# Patient Record
Sex: MALE | Race: WHITE | NOT HISPANIC OR LATINO | Employment: OTHER | URBAN - METROPOLITAN AREA
[De-identification: names, ages, dates, MRNs, and addresses within clinical notes are randomized per-mention and may not be internally consistent; named-entity substitution may affect disease eponyms.]

---

## 2023-02-08 ENCOUNTER — OFFICE VISIT (OUTPATIENT)
Dept: OTOLARYNGOLOGY | Facility: CLINIC | Age: 38
End: 2023-02-08
Payer: MEDICAID

## 2023-02-08 VITALS — WEIGHT: 121.4 LBS | TEMPERATURE: 97.7 F | HEIGHT: 64 IN | BODY MASS INDEX: 20.73 KG/M2

## 2023-02-08 DIAGNOSIS — J39.2 HYPERACTIVE GAG REFLEX: ICD-10-CM

## 2023-02-08 DIAGNOSIS — R13.10 DYSPHAGIA, UNSPECIFIED TYPE: Primary | ICD-10-CM

## 2023-02-08 DIAGNOSIS — R11.10 VOMITING IN ADULT: ICD-10-CM

## 2023-02-08 PROCEDURE — 99204 OFFICE O/P NEW MOD 45 MIN: CPT | Performed by: NURSE PRACTITIONER

## 2023-02-08 NOTE — PROGRESS NOTES
Patient Name: Albert Salguero   Visit Date: 02/08/2023   Patient ID: [id number]  Provider: GENA Ortega    Sex: male  Location: [facility]   YOB: 1985  Location Address: [address] [place] [address], [address] [place] [address],?[place]?[id number]    Primary Care Provider Rojas Choi MD  Location Phone: [phone]    Referring Provider: Rojas Choi MD        Chief Complaint  Difficulty Swallowing    Subjective   Albert Salguero is a 37 y.o. male who presents to [facility] today as a consult from Rojas Choi MD for evaluation of gagging and dysphagia symptoms.  He is accompanied by his cousin who is his care tender.  He has a history of Down syndrome.  She reports that she has been taking care of him for a few months now and over that time has noticed that he frequently gags and gets choked on his food easily.  He will often spit out his food or regurgitate food while eating.  She states a lot of times it is vegetables that this happens with but can be a variety of foods.  He has limited verbal communication skills and has not conveyed to them any signs of pain with eating.  They report that he frequently chews and sucks on his tongue and will often make gagging noises even when he is not eating.  His caregiver reports that he has gained weight since living with him and is eating a variety of foods although he does have certain foods and textures that he does not like and will frequently spit out.  They report that often when he eats he will get hiccups and this will make him vomit up food more.      No current outpatient medications on file prior to visit.     No current facility-administered medications on file prior to visit.        Social History     Tobacco Use   • Smoking status: Never     Passive exposure: Current   • Smokeless tobacco: Never   • Tobacco comments:     Elizabet smoke outside   Vaping Use   • Vaping Use: Never used       Objective  "    Vital Signs:   Temp 97.7 °F (36.5 °C) (Temporal)   Ht 162.6 cm (64\")   Wt 55.1 kg (121 lb 6.4 oz)   BMI 20.84 kg/m²       Physical Exam  Constitutional:       General: He is not in acute distress.     Appearance: Normal appearance. He is not ill-appearing.   HENT:      Head: Normocephalic and atraumatic.      Jaw: There is normal jaw occlusion. No tenderness or pain on movement.      Salivary Glands: Right salivary gland is not diffusely enlarged or tender. Left salivary gland is not diffusely enlarged or tender.      Right Ear: Tympanic membrane and external ear normal.      Left Ear: Tympanic membrane and external ear normal.      Ears:      Comments: Narrow ear canals bilaterally     Nose: Nose normal. No septal deviation.      Right Sinus: No maxillary sinus tenderness or frontal sinus tenderness.      Left Sinus: No maxillary sinus tenderness or frontal sinus tenderness.      Mouth/Throat:      Lips: Pink. No lesions.      Mouth: Mucous membranes are moist. No oral lesions.      Dentition: Normal dentition.      Tongue: No lesions.      Palate: No mass and lesions.      Pharynx: Oropharynx is clear. Uvula midline.      Tonsils: No tonsillar exudate.      Comments: Tongue is thickened  Eyes:      Extraocular Movements: Extraocular movements intact.      Conjunctiva/sclera: Conjunctivae normal.      Pupils: Pupils are equal, round, and reactive to light.   Neck:      Thyroid: No thyroid mass, thyromegaly or thyroid tenderness.      Trachea: Trachea normal.   Pulmonary:      Effort: Pulmonary effort is normal. No respiratory distress.   Musculoskeletal:         General: Normal range of motion.      Cervical back: Normal range of motion and neck supple. No tenderness.   Lymphadenopathy:      Cervical: No cervical adenopathy.   Skin:     General: Skin is warm and dry.   Neurological:      General: No focal deficit present.      Mental Status: He is alert and oriented to person, place, and time.      Cranial " Nerves: No cranial nerve deficit.      Motor: No weakness.      Gait: Gait normal.   Psychiatric:         Mood and Affect: Mood normal.         Behavior: Behavior normal.         Thought Content: Thought content normal.         Judgment: Judgment normal.               Result Review :              Assessment and Plan    Diagnoses and all orders for this visit:    1. Dysphagia, unspecified type (Primary)  -     Ambulatory Referral to Gastroenterology    2. Hyperactive gag reflex  -     Ambulatory Referral to Gastroenterology    3. Vomiting in adult  -     Ambulatory Referral to Gastroenterology    I was able to do a physical exam today on the patient.  He does have very narrow ear canals and a thickened tongue.  We have discussed his symptoms and I do feel like he would be better evaluated by gastroenterology due to his frequent vomiting episodes and difficulties with certain foods and inability to perform laryngoscopy on him in clinic today.  We have discussed the possibility that he is having reflux or possibly behavioral food intolerances.  He is not having any alarm symptoms and seems to be gaining weight well and eating a variety of foods with no known episodes of aspiration.  I will place referral to gastroenterology for possible EGD.       Follow Up   No follow-ups on file.  Patient was given instructions and counseling regarding his condition or for health maintenance advice. Please see specific information pulled into the AVS if appropriate.      GENA Ortega

## 2023-02-22 ENCOUNTER — OFFICE VISIT (OUTPATIENT)
Dept: CARDIOLOGY | Facility: CLINIC | Age: 38
End: 2023-02-22
Payer: MEDICAID

## 2023-02-22 VITALS
HEIGHT: 64 IN | SYSTOLIC BLOOD PRESSURE: 106 MMHG | DIASTOLIC BLOOD PRESSURE: 68 MMHG | BODY MASS INDEX: 19.81 KG/M2 | HEART RATE: 56 BPM | WEIGHT: 116 LBS

## 2023-02-22 DIAGNOSIS — Z87.74 HISTORY OF REPAIR OF CONGENITAL ANOMALY OF HEART: Primary | ICD-10-CM

## 2023-02-22 DIAGNOSIS — Q90.9 DOWN'S SYNDROME: ICD-10-CM

## 2023-03-15 NOTE — PROGRESS NOTES
"Chief Complaint   Dysphagia/ vomiting     History of Present Illness       Albert Salguero is a 37 y.o. male who [facility] with a history of Down syndrome, dysphagia, nausea, vomiting and gagging.  Patient presents to the office with his cousins who are his care tenders.  Patient was previously evaluated by ENT and referred to gastroenterology due to gagging and vomiting due to possible reflux.  Patient's cousin reports gagging, clicking noise, enlarged tongue, trouble swallowing, vomiting and frequent hiccups.  Patient has never had previous EGD.  Patient evaluated by cardiologist Dr. Coleman due to congenital heart defect.  Patient family denies fever,  weight loss, night sweats, melena, hematochezia, hematemesis.  No EGD or colon on file for this patient     Most recent labs on 01.13.2023 see below  Results       Result Review :                             Past Medical History       Past Medical History:   Diagnosis Date   • CHD (congenital heart disease)    • Eczema    • Hx of heart surgery        Past Surgical History:   Procedure Laterality Date   • CARDIAC SURGERY     • DENTAL PROCEDURE           Current Outpatient Medications:   •  famotidine (PEPCID) 40 mg/5 mL suspension, Take 2.5 mL by mouth 2 (Two) Times a Day for 50 days., Disp: 150 mL, Rfl: 3     No Known Allergies    Family History   Problem Relation Age of Onset   • No Known Problems Mother    • No Known Problems Father         Social History     Social History Narrative   • Not on file       Objective   Vital Signs:   /72   Pulse 69   Ht 162.6 cm (64\")   Wt 54 kg (119 lb)   SpO2 99%   BMI 20.43 kg/m²       Physical Exam  Constitutional:       General: He is not in acute distress.     Appearance: Normal appearance. He is well-developed and normal weight.   HENT:      Head:      Comments: Enlarged tongue  Eyes:      Conjunctiva/sclera: Conjunctivae normal.      Pupils: Pupils are " equal, round, and reactive to light.      Visual Fields: Right eye visual fields normal and left eye visual fields normal.   Cardiovascular:      Rate and Rhythm: Normal rate and regular rhythm.      Heart sounds: Normal heart sounds.   Pulmonary:      Effort: Pulmonary effort is normal. No retractions.      Breath sounds: Normal breath sounds and air entry.      Comments: Inspection of chest: normal appearance  Abdominal:      General: Bowel sounds are normal.      Palpations: Abdomen is soft.      Tenderness: There is no abdominal tenderness.      Comments: No appreciable hepatosplenomegaly   Musculoskeletal:      Cervical back: Neck supple.      Right lower leg: No edema.      Left lower leg: No edema.   Lymphadenopathy:      Cervical: No cervical adenopathy.   Skin:     Findings: No lesion.      Comments: Turgor normal   Neurological:      Mental Status: He is alert. Mental status is at baseline.   Psychiatric:         Mood and Affect: Mood and affect normal.           Assessment & Plan          Assessment and Plan    Diagnoses and all orders for this visit:    1. Oropharyngeal dysphagia (Primary)    2. Nausea    3. Nausea and vomiting, unspecified vomiting type    4. Episode of gagging    Other orders  -     famotidine (PEPCID) 40 mg/5 mL suspension; Take 2.5 mL by mouth 2 (Two) Times a Day for 50 days.  Dispense: 150 mL; Refill: 3      37-year-old male presenting the office today as new patient with a history of Down syndrome, dysphagia, nausea, vomiting and gagging.  I have recommended that the patient undergo further evaluation with an EGD.  I have discussed this procedure in detail with the patient.  I have discussed the risks, benefits and alternatives.  I have discussed the risk of anesthesia, bleeding and perforation.  Patient understands these risks, benefits and alternatives and wishes to proceed.  I will schedule him at his earliest convenience.  We will seek cardiac clearance from Dr. Coleman.  I have  started the patient on Pepcid suspension due to the patient not tolerating pills.  We will follow-up in office after endoscopy.  Patient and family agreeable to this plan will call with any questions or concerns.            Follow Up       Follow Up   Return for Follow up after endoscopy in office.  Patient was given instructions and counseling regarding his condition or for health maintenance advice. Please see specific information pulled into the AVS if appropriate.

## 2023-03-16 ENCOUNTER — PREP FOR SURGERY (OUTPATIENT)
Dept: OTHER | Facility: HOSPITAL | Age: 38
End: 2023-03-16
Payer: MEDICAID

## 2023-03-16 ENCOUNTER — OFFICE VISIT (OUTPATIENT)
Dept: GASTROENTEROLOGY | Facility: CLINIC | Age: 38
End: 2023-03-16
Payer: MEDICAID

## 2023-03-16 VITALS
BODY MASS INDEX: 20.32 KG/M2 | DIASTOLIC BLOOD PRESSURE: 72 MMHG | SYSTOLIC BLOOD PRESSURE: 108 MMHG | WEIGHT: 119 LBS | HEIGHT: 64 IN | OXYGEN SATURATION: 99 % | HEART RATE: 69 BPM

## 2023-03-16 DIAGNOSIS — R19.8 EPISODE OF GAGGING: ICD-10-CM

## 2023-03-16 DIAGNOSIS — R13.12 OROPHARYNGEAL DYSPHAGIA: Primary | ICD-10-CM

## 2023-03-16 DIAGNOSIS — Q90.9 DOWN'S SYNDROME: ICD-10-CM

## 2023-03-16 DIAGNOSIS — R13.10 DYSPHAGIA, UNSPECIFIED TYPE: ICD-10-CM

## 2023-03-16 DIAGNOSIS — R11.0 NAUSEA: ICD-10-CM

## 2023-03-16 DIAGNOSIS — R11.2 NAUSEA AND VOMITING, UNSPECIFIED VOMITING TYPE: ICD-10-CM

## 2023-03-16 PROCEDURE — 1159F MED LIST DOCD IN RCRD: CPT | Performed by: NURSE PRACTITIONER

## 2023-03-16 PROCEDURE — 99214 OFFICE O/P EST MOD 30 MIN: CPT | Performed by: NURSE PRACTITIONER

## 2023-03-16 PROCEDURE — 1160F RVW MEDS BY RX/DR IN RCRD: CPT | Performed by: NURSE PRACTITIONER

## 2023-03-16 RX ORDER — FAMOTIDINE 40 MG/5ML
20 POWDER, FOR SUSPENSION ORAL 2 TIMES DAILY
Qty: 150 ML | Refills: 3 | Status: SHIPPED | OUTPATIENT
Start: 2023-03-16 | End: 2023-05-05

## 2023-03-16 NOTE — PATIENT INSTRUCTIONS
Food Choices for Gastroesophageal Reflux Disease, Adult  When you have gastroesophageal reflux disease (GERD), the foods you eat and your eating habits are very important. Choosing the right foods can help ease your discomfort. Think about working with a food expert (dietitian) to help you make good choices.  What are tips for following this plan?  Reading food labels  Look for foods that are low in saturated fat. Foods that may help with your symptoms include:  Foods that have less than 5% of daily value (DV) of fat.  Foods that have 0 grams of trans fat.  Cooking  Do not angeles your food.  Cook your food by baking, steaming, grilling, or broiling. These are all methods that do not need a lot of fat for cooking.  To add flavor, try to use herbs that are low in spice and acidity.  Meal planning    Choose healthy foods that are low in fat, such as:  Fruits and vegetables.  Whole grains.  Low-fat dairy products.  Lean meats, fish, and poultry.  Eat small meals often instead of eating 3 large meals each day. Eat your meals slowly in a place where you are relaxed. Avoid bending over or lying down until 2-3 hours after eating.  Limit high-fat foods such as fatty meats or fried foods.  Limit your intake of fatty foods, such as oils, butter, and shortening.  Avoid the following as told by your doctor:  Foods that cause symptoms. These may be different for different people. Keep a food diary to keep track of foods that cause symptoms.  Alcohol.  Drinking a lot of liquid with meals.  Eating meals during the 2-3 hours before bed.  Lifestyle  Stay at a healthy weight. Ask your doctor what weight is healthy for you. If you need to lose weight, work with your doctor to do so safely.  Exercise for at least 30 minutes on 5 or more days each week, or as told by your doctor.  Wear loose-fitting clothes.  Do not smoke or use any products that contain nicotine or tobacco. If you need help quitting, ask your doctor.  Sleep with the head  of your bed higher than your feet. Use a wedge under the mattress or blocks under the bed frame to raise the head of the bed.  Chew sugar-free gum after meals.  What foods should eat?  Eat a healthy, well-balanced diet of fruits, vegetables, whole grains, low-fat dairy products, lean meats, fish, and poultry. Each person is different.  Foods that may cause symptoms in one person may not cause any symptoms in another person. Work with your doctor to find foods that are safe for you.  The items listed above may not be a complete list of what you can eat and drink. Contact a food expert for more options.  What foods should I avoid?  Limiting some of these foods may help in managing the symptoms of GERD. Everyone is different. Talk with a food expert or your doctor to help you find the exact foods to avoid, if any.  Fruits  Any fruits prepared with added fat. Any fruits that cause symptoms. For some people, this may include citrus fruits, such as oranges, grapefruit, pineapple, and zaida.  Vegetables  Deep-fried vegetables. French fries. Any vegetables prepared with added fat. Any vegetables that cause symptoms. For some people, this may include tomatoes and tomato products, chili peppers, onions and garlic, and horseradish.  Grains  Pastries or quick breads with added fat.  Meats and other proteins  High-fat meats, such as fatty beef or pork, hot dogs, ribs, ham, sausage, salami, and farrar. Fried meat or protein, including fried fish and fried chicken. Nuts and nut butters, in large amounts.  Dairy  Whole milk and chocolate milk. Sour cream. Cream. Ice cream. Cream cheese. Milkshakes.  Fats and oils  Butter. Margarine. Shortening. Ghee.  Beverages  Coffee and tea, with or without caffeine. Carbonated beverages. Sodas. Energy drinks. Fruit juice made with acidic fruits, such as orange or grapefruit. Tomato juice. Alcoholic drinks.  Sweets and desserts  Chocolate and cocoa. Donuts.  Seasonings and condiments  Pepper.  Peppermint and spearmint. Added salt. Any condiments, herbs, or seasonings that cause symptoms. For some people, this may include girard, hot sauce, or vinegar-based salad dressings.  The items listed above may not be a complete list of what you should not eat and drink. Contact a food expert for more options.  Questions to ask your doctor  Diet and lifestyle changes are often the first steps that are taken to manage symptoms of GERD. If diet and lifestyle changes do not help, talk with your doctor about taking medicines.  Where to find more information  International Foundation for Gastrointestinal Disorders: aboutgerd.org  Summary  When you have GERD, food and lifestyle choices are very important in easing your symptoms.  Eat small meals often instead of 3 large meals a day. Eat your meals slowly and in a place where you are relaxed.  Avoid bending over or lying down until 2-3 hours after eating.  Limit high-fat foods such as fatty meats or fried foods.  This information is not intended to replace advice given to you by your health care provider. Make sure you discuss any questions you have with your health care provider.  Document Revised: 06/28/2021 Document Reviewed: 06/28/2021  Elsevier Patient Education © 2022 Else[employer] Inc.

## 2023-04-17 ENCOUNTER — TELEPHONE (OUTPATIENT)
Dept: GASTROENTEROLOGY | Facility: CLINIC | Age: 38
End: 2023-04-17
Payer: MEDICAID

## 2023-04-17 NOTE — TELEPHONE ENCOUNTER
I spoke with Fozia Maloney, Per patient GALDINO. Advised her that we will need to reschedule patients EGD for after his cardio ECHO. Patient voiced understanding but did not have her scheduling book at the time. Mrs. Llamas stated she would call me first thing in the morning to get pts EGD rescheduled.

## 2023-04-18 NOTE — TELEPHONE ENCOUNTER
Patients EGD has been rescheduled for 6/14/23 with an estimated arrival time of 2pm. Patients guardian voiced understanding.

## 2023-05-11 ENCOUNTER — HOSPITAL ENCOUNTER (OUTPATIENT)
Dept: CARDIOLOGY | Facility: HOSPITAL | Age: 38
Discharge: HOME | End: 2023-05-11
Payer: MEDICAID

## 2023-05-11 DIAGNOSIS — Z87.74 HISTORY OF REPAIR OF CONGENITAL ANOMALY OF HEART: ICD-10-CM

## 2023-05-11 DIAGNOSIS — Q90.9 DOWN'S SYNDROME: ICD-10-CM

## 2023-05-11 PROCEDURE — 93306 TTE W/DOPPLER COMPLETE: CPT | Performed by: INTERNAL MEDICINE

## 2023-05-11 PROCEDURE — 93306 TTE W/DOPPLER COMPLETE: CPT

## 2023-05-12 ENCOUNTER — TELEPHONE (OUTPATIENT)
Dept: CARDIOLOGY | Facility: CLINIC | Age: 38
End: 2023-05-12
Payer: MEDICAID

## 2023-05-12 LAB
BH CV ECHO MEAS - AO MAX PG: 7 MMHG
BH CV ECHO MEAS - AO MEAN PG: 3.8 MMHG
BH CV ECHO MEAS - AO ROOT DIAM: 2.43 CM
BH CV ECHO MEAS - AO V2 MAX: 130 CM/SEC
BH CV ECHO MEAS - AO V2 VTI: 26.4 CM
BH CV ECHO MEAS - AVA(I,D): 1.97 CM2
BH CV ECHO MEAS - EDV(CUBED): 76.9 ML
BH CV ECHO MEAS - EDV(MOD-SP2): 61.9 ML
BH CV ECHO MEAS - EDV(MOD-SP4): 68.2 ML
BH CV ECHO MEAS - EF(MOD-BP): 63.8 %
BH CV ECHO MEAS - EF(MOD-SP2): 66.9 %
BH CV ECHO MEAS - EF(MOD-SP4): 59.1 %
BH CV ECHO MEAS - ESV(CUBED): 20.7 ML
BH CV ECHO MEAS - ESV(MOD-SP2): 20.5 ML
BH CV ECHO MEAS - ESV(MOD-SP4): 27.9 ML
BH CV ECHO MEAS - FS: 35.4 %
BH CV ECHO MEAS - IVS/LVPW: 1.09 CM
BH CV ECHO MEAS - IVSD: 0.89 CM
BH CV ECHO MEAS - LA DIMENSION: 3.1 CM
BH CV ECHO MEAS - LAT PEAK E' VEL: 19.3 CM/SEC
BH CV ECHO MEAS - LV DIASTOLIC VOL/BSA (35-75): 43.5 CM2
BH CV ECHO MEAS - LV MASS(C)D: 112 GRAMS
BH CV ECHO MEAS - LV MAX PG: 3.2 MMHG
BH CV ECHO MEAS - LV MEAN PG: 1.95 MMHG
BH CV ECHO MEAS - LV SYSTOLIC VOL/BSA (12-30): 17.8 CM2
BH CV ECHO MEAS - LV V1 MAX: 90 CM/SEC
BH CV ECHO MEAS - LV V1 VTI: 16.4 CM
BH CV ECHO MEAS - LVIDD: 4.3 CM
BH CV ECHO MEAS - LVIDS: 2.7 CM
BH CV ECHO MEAS - LVOT AREA: 3.2 CM2
BH CV ECHO MEAS - LVOT DIAM: 2.01 CM
BH CV ECHO MEAS - LVPWD: 0.81 CM
BH CV ECHO MEAS - MED PEAK E' VEL: 9.6 CM/SEC
BH CV ECHO MEAS - MV A MAX VEL: 50.1 CM/SEC
BH CV ECHO MEAS - MV DEC SLOPE: 431.9 CM/SEC2
BH CV ECHO MEAS - MV DEC TIME: 0.24 MSEC
BH CV ECHO MEAS - MV E MAX VEL: 75.8 CM/SEC
BH CV ECHO MEAS - MV E/A: 1.51
BH CV ECHO MEAS - MV MEAN PG: 1.44 MMHG
BH CV ECHO MEAS - MV P1/2T: 73 MSEC
BH CV ECHO MEAS - MV V2 VTI: 26 CM
BH CV ECHO MEAS - MVA(P1/2T): 3 CM2
BH CV ECHO MEAS - MVA(VTI): 2 CM2
BH CV ECHO MEAS - RAP SYSTOLE: 3 MMHG
BH CV ECHO MEAS - RVDD: 2.04 CM
BH CV ECHO MEAS - RVSP: 22.8 MMHG
BH CV ECHO MEAS - SI(MOD-SP2): 26.4 ML/M2
BH CV ECHO MEAS - SI(MOD-SP4): 25.7 ML/M2
BH CV ECHO MEAS - SV(LVOT): 52.1 ML
BH CV ECHO MEAS - SV(MOD-SP2): 41.4 ML
BH CV ECHO MEAS - SV(MOD-SP4): 40.3 ML
BH CV ECHO MEAS - TR MAX PG: 19.8 MMHG
BH CV ECHO MEAS - TR MAX VEL: 222.6 CM/SEC
BH CV ECHO MEASUREMENTS AVERAGE E/E' RATIO: 5.25
BH CV ECHO SHUNT ASSESSMENT PERFORMED (HIDDEN SCRIPTING): 1
IVRT: 95 MSEC
LEFT ATRIUM VOLUME INDEX: 24.7 ML/M2
MAXIMAL PREDICTED HEART RATE: 184 BPM
STRESS TARGET HR: 156 BPM

## 2023-05-12 NOTE — TELEPHONE ENCOUNTER
Procedure: Colonoscopy and/or EGD    Medication Directive: NA    PMH: Congenital anomaly of heart repair, Down Syndrome    Last Seen: 02/02/23    ECHO: 5/11/23  Left ventricular ejection fraction appears to be 61 - 65%.  •  Left ventricular diastolic function was normal.  •  Saline test results are negative.  •  Estimated right ventricular systolic pressure from tricuspid regurgitation is normal (<35 mmHg).  •  Interatrial septum is thickened and bright indicating possible prior repair, no evidence of residual ASD

## 2023-05-12 NOTE — TELEPHONE ENCOUNTER
He may proceed with upcoming colonoscopy and/or EGD at moderate risk for perioperative cardiac events.

## 2023-06-07 ENCOUNTER — TELEPHONE (OUTPATIENT)
Dept: CARDIOLOGY | Facility: CLINIC | Age: 38
End: 2023-06-07
Payer: MEDICAID

## 2023-06-07 NOTE — TELEPHONE ENCOUNTER
----- Message from GENA Andrade sent at 6/4/2023  2:24 PM EDT -----  Echocardiogram shows normal function of the heart muscles with an EF of 61 to 65%.  There is no residual opening from his previously repaired septal defect.  No treatment change recommendations.  Recommend routine follow-up in 6 to 9 months, unless there are new concerns.  Please let patient/family know it will be with another provider in the practice as Dr. Coleman is no longer with us.

## 2023-06-08 NOTE — PRE-PROCEDURE INSTRUCTIONS
Instructed patient on date and arrival time for procedure.  Come to entrance C.  Will need a  over the age of 18 to drive them home.  May have two visitors.  No children under 12.  Over 12 must be accompanied by an adult.  Do not take medications the day of procedure but bring them to the hospital.  Discussed NPO and diet.  Verbalized understanding of instructions given. Gave number to call if questions or concerns.  Spoke with guardian.  Cardiac clearance noted in chart.

## 2023-06-14 ENCOUNTER — HOSPITAL ENCOUNTER (OUTPATIENT)
Facility: HOSPITAL | Age: 38
Setting detail: HOSPITAL OUTPATIENT SURGERY
Discharge: HOME | End: 2023-06-14
Attending: INTERNAL MEDICINE | Admitting: INTERNAL MEDICINE
Payer: MEDICAID

## 2023-06-14 ENCOUNTER — ANESTHESIA EVENT (OUTPATIENT)
Dept: GASTROENTEROLOGY | Facility: HOSPITAL | Age: 38
End: 2023-06-14
Payer: MEDICAID

## 2023-06-14 ENCOUNTER — ANESTHESIA (OUTPATIENT)
Dept: GASTROENTEROLOGY | Facility: HOSPITAL | Age: 38
End: 2023-06-14
Payer: MEDICAID

## 2023-06-14 VITALS
OXYGEN SATURATION: 95 % | RESPIRATION RATE: 14 BRPM | DIASTOLIC BLOOD PRESSURE: 65 MMHG | HEIGHT: 60 IN | BODY MASS INDEX: 22.07 KG/M2 | HEART RATE: 60 BPM | TEMPERATURE: 98.6 F | WEIGHT: 112.43 LBS | SYSTOLIC BLOOD PRESSURE: 96 MMHG

## 2023-06-14 DIAGNOSIS — R11.2 NAUSEA AND VOMITING, UNSPECIFIED VOMITING TYPE: ICD-10-CM

## 2023-06-14 DIAGNOSIS — Q90.9 DOWN'S SYNDROME: ICD-10-CM

## 2023-06-14 DIAGNOSIS — R13.12 OROPHARYNGEAL DYSPHAGIA: ICD-10-CM

## 2023-06-14 DIAGNOSIS — R13.10 DYSPHAGIA, UNSPECIFIED TYPE: ICD-10-CM

## 2023-06-14 DIAGNOSIS — R19.8 EPISODE OF GAGGING: ICD-10-CM

## 2023-06-14 DIAGNOSIS — R11.0 NAUSEA: ICD-10-CM

## 2023-06-14 PROCEDURE — 25010000002 PROPOFOL 10 MG/ML EMULSION: Performed by: NURSE ANESTHETIST, CERTIFIED REGISTERED

## 2023-06-14 PROCEDURE — C1726 CATH, BAL DIL, NON-VASCULAR: HCPCS | Performed by: INTERNAL MEDICINE

## 2023-06-14 PROCEDURE — 43239 EGD BIOPSY SINGLE/MULTIPLE: CPT | Performed by: INTERNAL MEDICINE

## 2023-06-14 PROCEDURE — 43249 ESOPH EGD DILATION <30 MM: CPT | Performed by: INTERNAL MEDICINE

## 2023-06-14 PROCEDURE — 88305 TISSUE EXAM BY PATHOLOGIST: CPT | Performed by: INTERNAL MEDICINE

## 2023-06-14 RX ORDER — SODIUM CHLORIDE, SODIUM LACTATE, POTASSIUM CHLORIDE, CALCIUM CHLORIDE 600; 310; 30; 20 MG/100ML; MG/100ML; MG/100ML; MG/100ML
INJECTION, SOLUTION INTRAVENOUS CONTINUOUS PRN
Status: DISCONTINUED | OUTPATIENT
Start: 2023-06-14 | End: 2023-06-14 | Stop reason: SURG

## 2023-06-14 RX ORDER — PROPOFOL 10 MG/ML
VIAL (ML) INTRAVENOUS AS NEEDED
Status: DISCONTINUED | OUTPATIENT
Start: 2023-06-14 | End: 2023-06-14 | Stop reason: SURG

## 2023-06-14 RX ORDER — GLYCOPYRROLATE 0.2 MG/ML
INJECTION INTRAMUSCULAR; INTRAVENOUS AS NEEDED
Status: DISCONTINUED | OUTPATIENT
Start: 2023-06-14 | End: 2023-06-14 | Stop reason: SURG

## 2023-06-14 RX ORDER — MIDAZOLAM HYDROCHLORIDE 2 MG/ML
10 SYRUP ORAL ONCE
Status: COMPLETED | OUTPATIENT
Start: 2023-06-14 | End: 2023-06-14

## 2023-06-14 RX ORDER — PHENYLEPHRINE HCL IN 0.9% NACL 1 MG/10 ML
SYRINGE (ML) INTRAVENOUS AS NEEDED
Status: DISCONTINUED | OUTPATIENT
Start: 2023-06-14 | End: 2023-06-14 | Stop reason: SURG

## 2023-06-14 RX ORDER — SODIUM CHLORIDE, SODIUM LACTATE, POTASSIUM CHLORIDE, CALCIUM CHLORIDE 600; 310; 30; 20 MG/100ML; MG/100ML; MG/100ML; MG/100ML
30 INJECTION, SOLUTION INTRAVENOUS CONTINUOUS
Status: DISCONTINUED | OUTPATIENT
Start: 2023-06-14 | End: 2023-06-14 | Stop reason: HOSPADM

## 2023-06-14 RX ORDER — LIDOCAINE HYDROCHLORIDE 20 MG/ML
INJECTION, SOLUTION EPIDURAL; INFILTRATION; INTRACAUDAL; PERINEURAL AS NEEDED
Status: DISCONTINUED | OUTPATIENT
Start: 2023-06-14 | End: 2023-06-14 | Stop reason: SURG

## 2023-06-14 RX ORDER — FAMOTIDINE 40 MG/5ML
20 POWDER, FOR SUSPENSION ORAL 2 TIMES DAILY
COMMUNITY

## 2023-06-14 RX ADMIN — GLYCOPYRROLATE 0.2 MG: 0.2 INJECTION INTRAMUSCULAR; INTRAVENOUS at 10:09

## 2023-06-14 RX ADMIN — SODIUM CHLORIDE, POTASSIUM CHLORIDE, SODIUM LACTATE AND CALCIUM CHLORIDE: 600; 310; 30; 20 INJECTION, SOLUTION INTRAVENOUS at 09:27

## 2023-06-14 RX ADMIN — MIDAZOLAM HYDROCHLORIDE 10 MG: 2 SYRUP ORAL at 08:27

## 2023-06-14 RX ADMIN — SODIUM CHLORIDE, POTASSIUM CHLORIDE, SODIUM LACTATE AND CALCIUM CHLORIDE 30 ML/HR: 600; 310; 30; 20 INJECTION, SOLUTION INTRAVENOUS at 08:58

## 2023-06-14 RX ADMIN — LIDOCAINE HYDROCHLORIDE 100 MG: 20 INJECTION, SOLUTION EPIDURAL; INFILTRATION; INTRACAUDAL; PERINEURAL at 09:30

## 2023-06-14 RX ADMIN — Medication 100 MCG: at 09:39

## 2023-06-14 RX ADMIN — PROPOFOL 333 MCG/KG/MIN: 10 INJECTION, EMULSION INTRAVENOUS at 09:30

## 2023-06-14 RX ADMIN — PROPOFOL 50 MG: 10 INJECTION, EMULSION INTRAVENOUS at 09:30

## 2023-06-14 NOTE — ANESTHESIA PREPROCEDURE EVALUATION
Anesthesia Evaluation     Patient summary reviewed and Nursing notes reviewed   no history of anesthetic complications:   NPO Solid Status: > 8 hours  NPO Liquid Status: > 2 hours           Airway   Possible difficult intubation  Dental      Comment: Unable to access, multiple very loose teeth, going to dentist soon    Pulmonary - negative pulmonary ROS and normal exam    breath sounds clear to auscultation  Cardiovascular - negative cardio ROS and normal exam  Exercise tolerance: good (4-7 METS)    Rhythm: regular  Rate: normal        Neuro/Psych- negative ROS    ROS Comment: Down's syndrome  GI/Hepatic/Renal/Endo - negative ROS     Musculoskeletal (-) negative ROS    Abdominal    Substance History - negative use     OB/GYN negative ob/gyn ROS         Other - negative ROS       ROS/Med Hx Other: PAT Nursing Notes unavailable.                 Anesthesia Plan    ASA 3     general       Anesthetic plan, risks, benefits, and alternatives have been provided, discussed and informed consent has been obtained with: other and legal guardian.    CODE STATUS:

## 2023-06-14 NOTE — ANESTHESIA POSTPROCEDURE EVALUATION
Patient: Albert Salguero    Procedure Summary       Date: 06/14/23 Room / Location: [facility] ENDOSCOPY 2 / [facility] ENDOSCOPY    Anesthesia Start: 0927 Anesthesia Stop: 0949    Procedure: ESOPHAGOGASTRODUODENOSCOPY with biopsies, diloation with 15- 18 mm balloon Diagnosis:       Oropharyngeal dysphagia      Nausea      Episode of gagging      Nausea and vomiting, unspecified vomiting type      Down's syndrome      Dysphagia, unspecified type      (Oropharyngeal dysphagia [R13.12])      (Nausea [R11.0])      (Episode of gagging [R19.8])      (Nausea and vomiting, unspecified vomiting type [R11.2])      (Down's syndrome [Q90.9])      (Dysphagia, unspecified type [R13.10])    Surgeons: Garfield Kumar MD Provider: Alek Dykes MD    Anesthesia Type: general ASA Status: 3            Anesthesia Type: general    Vitals  Vitals Value Taken Time   BP 96/65 06/14/23 1034   Temp 37 °C (98.6 °F) 06/14/23 0949   Pulse 60 06/14/23 1034   Resp 14 06/14/23 1034   SpO2 95 % 06/14/23 1034           Post Anesthesia Care and Evaluation    Patient location during evaluation: bedside  Patient participation: complete - patient participated  Level of consciousness: awake  Pain management: adequate    Airway patency: patent  PONV Status: none  Cardiovascular status: acceptable and stable  Respiratory status: acceptable  Hydration status: acceptable    Comments: An Anesthesiologist personally participated in the most demanding procedures (including induction and emergence if applicable) in the anesthesia plan, monitored the course of anesthesia administration at frequent intervals and remained physically present and available for immediate diagnosis and treatment of emergencies.

## 2023-06-14 NOTE — H&P
Pre Procedure History & Physical    Chief Complaint:   Nausea  Vomiting  Gerd  dysphagia    Subjective     HPI:   As above    Past Medical History:   Past Medical History:   Diagnosis Date    CHD (congenital heart disease)     Eczema     Hx of heart surgery        Past Surgical History:  Past Surgical History:   Procedure Laterality Date    CARDIAC SURGERY      DENTAL PROCEDURE         Family History:  Family History   Problem Relation Age of Onset    No Known Problems Mother     No Known Problems Father        Social History:   reports that he has never smoked. He has been exposed to tobacco smoke. He has never used smokeless tobacco. He reports that he does not drink alcohol and does not use drugs.    Medications:   No medications prior to admission.       Allergies:  Patient has no known allergies.        Objective     Weight 51 kg (112 lb 7 oz).    Physical Exam   Constitutional: Pt is oriented to person, place, and time and well-developed, well-nourished, and in no distress.   Mouth/Throat: Oropharynx is clear and moist.   Neck: Normal range of motion.   Cardiovascular: Normal rate, regular rhythm and normal heart sounds.    Pulmonary/Chest: Effort normal and breath sounds normal.   Abdominal: Soft. Nontender  Skin: Skin is warm and dry.   Psychiatric: Mood, memory, affect and judgment normal.     Assessment & Plan     Diagnosis:  Nausea  Vomiting  Gerd  dysphagia    Anticipated Surgical Procedure:  egd    The risks, benefits, and alternatives of this procedure have been discussed with the patient or the responsible party- the patient understands and agrees to proceed.

## 2023-06-15 LAB
CYTO UR: NORMAL
LAB AP CASE REPORT: NORMAL
LAB AP CLINICAL INFORMATION: NORMAL
PATH REPORT.FINAL DX SPEC: NORMAL
PATH REPORT.GROSS SPEC: NORMAL

## 2023-06-16 ENCOUNTER — TELEPHONE (OUTPATIENT)
Dept: GASTROENTEROLOGY | Facility: CLINIC | Age: 38
End: 2023-06-16
Payer: MEDICAID

## 2023-06-16 NOTE — TELEPHONE ENCOUNTER
----- Message from GENA Garcia sent at 6/15/2023  2:11 PM EDT -----  Reflux esophagitis. I have reviewed upper endoscopy. Negative results for H. Pylori, metaplasia, dysplasia and malignancy.

## 2023-09-22 ENCOUNTER — HOSPITAL ENCOUNTER (EMERGENCY)
Facility: HOSPITAL | Age: 38
Discharge: HOME | End: 2023-09-23
Attending: EMERGENCY MEDICINE
Payer: MEDICAID

## 2023-09-22 DIAGNOSIS — Q90.9 DOWN SYNDROME: ICD-10-CM

## 2023-09-22 DIAGNOSIS — Z71.1 WORRIED WELL: Primary | ICD-10-CM

## 2023-09-22 PROCEDURE — 99282 EMERGENCY DEPT VISIT SF MDM: CPT

## 2023-09-23 VITALS
OXYGEN SATURATION: 100 % | RESPIRATION RATE: 18 BRPM | HEART RATE: 76 BPM | BODY MASS INDEX: 19.22 KG/M2 | DIASTOLIC BLOOD PRESSURE: 57 MMHG | HEIGHT: 63 IN | TEMPERATURE: 99.1 F | WEIGHT: 108.47 LBS | SYSTOLIC BLOOD PRESSURE: 122 MMHG

## 2023-09-23 NOTE — ED PROVIDER NOTES
"Time: 8:50 PM EDT  Date of encounter:  9/22/2023  Independent Historian/Clinical History and Information was obtained by:   Family    History is limited by: N/A    Chief Complaint   Patient presents with    Rash     Guardian states, \"Patient has worms and larva coming out of his ears, and on his scrotum.\"         History of Present Illness:  Patient is a 38 y.o. year old male who presents to the emergency department for evaluation of parasites coming out of his ears and worms from his scrotum.  Guardian also states that he is having LarvE tenting of his nose.  Guardian states that she gave a stool sample to his PCP to check for worms.    HPI    Patient Care Team  Primary Care Provider: Rojas Choi MD    Past Medical History:     No Known Allergies  Past Medical History:   Diagnosis Date    CHD (congenital heart disease)     Down syndrome     Eczema     Hx of heart surgery      Past Surgical History:   Procedure Laterality Date    CARDIAC SURGERY      DENTAL PROCEDURE      ENDOSCOPY N/A 6/14/2023    Procedure: ESOPHAGOGASTRODUODENOSCOPY with biopsies, diloation with 15- 18 mm balloon;  Surgeon: Garfield Kumar MD;  Location: [facility] ENDOSCOPY;  Service: Gastroenterology;  Laterality: N/A;  hiatal hernia, stricture     Family History   Problem Relation Age of Onset    No Known Problems Mother     No Known Problems Father        Home Medications:  Prior to Admission medications    Medication Sig Start Date End Date Taking? Authorizing Provider   clotrimazole (LOTRIMIN) 1 % cream Use for no longer than 7 days.  Mix in equal portion with triamcinolone cream and apply to jock itch twice a day. 7/26/23   Avi Elmore MD   hydrocortisone 1 % cream Apply 1 application  topically to the appropriate area as directed 2 (Two) Times a Day. May also apply to irritated areas of face twice daily for up to 1 week. 8/1/23   Avi Elmore MD   triamcinolone (KENALOG) 0.1 % cream Use for no longer than 1 week.  Mix in " "equal portion with clotrimazole cream and apply to jock itch twice daily. 7/26/23   Avi Elmore MD        Social History:   Social History     Tobacco Use    Smoking status: Never     Passive exposure: Current    Smokeless tobacco: Never    Tobacco comments:     Elizabet smoke outside   Vaping Use    Vaping Use: Never used   Substance Use Topics    Alcohol use: Never    Drug use: Never         Review of Systems:  Review of Systems   HENT:  Positive for ear discharge (Per guardian patient had larva coming out of his ears and nose).    Skin:  Positive for rash (Per guardian on scrotum).      Physical Exam:  /57   Pulse 76   Temp 99.1 °F (37.3 °C) (Oral)   Resp 18   Ht 160 cm (63\")   Wt 49.2 kg (108 lb 7.5 oz)   SpO2 100%   BMI 19.21 kg/m²         Physical Exam  Vitals and nursing note reviewed.   Constitutional:       General: He is not in acute distress.     Appearance: Normal appearance. He is not ill-appearing or toxic-appearing.   HENT:      Head: Normocephalic and atraumatic.      Right Ear: Tympanic membrane, ear canal and external ear normal.      Left Ear: Tympanic membrane, ear canal and external ear normal.      Nose: Nose normal.      Mouth/Throat:      Mouth: Mucous membranes are moist.   Eyes:      General: No scleral icterus.     Conjunctiva/sclera: Conjunctivae normal.   Cardiovascular:      Rate and Rhythm: Normal rate and regular rhythm.      Pulses: Normal pulses.      Heart sounds: Normal heart sounds.   Pulmonary:      Effort: Pulmonary effort is normal. No respiratory distress.      Breath sounds: Normal breath sounds.   Abdominal:      General: Abdomen is flat. Bowel sounds are normal. There is no distension.      Palpations: Abdomen is soft.      Tenderness: There is no abdominal tenderness. There is no right CVA tenderness or left CVA tenderness.   Genitourinary:     Penis: Normal.       Testes: Normal.      Comments: Scrotum without abnormalities or lesions  Musculoskeletal: "         General: Normal range of motion.      Cervical back: Normal range of motion and neck supple.   Skin:     General: Skin is warm and dry.      Coloration: Skin is not cyanotic.   Neurological:      Mental Status: He is alert. Mental status is at baseline.   Psychiatric:         Attention and Perception: Attention and perception normal.         Mood and Affect: Mood normal.         Behavior: Behavior normal.                Medical Decision Making:      Comorbidities that affect care:    Down syndrome, nonverbal    External Notes reviewed:    Previous Clinic Note: Patient seen in clinic August 1 urgent care for dermatitis      The following orders were placed and all results were independently analyzed by me:  No orders of the defined types were placed in this encounter.      Medications Given in the Emergency Department:  Medications - No data to display     ED Course:    The patient was initially evaluated in the triage area where orders were placed. The patient was later dispositioned by GENA Pearl.      The patient was advised to stay for completion of workup which includes but is not limited to communication of labs and radiological results, reassessment and plan. The patient was advised that leaving prior to disposition by a provider could result in critical findings that are not communicated to the patient.     ED Course as of 09/23/23 0700   Fri Sep 22, 2023   2051 --- PROVIDER IN TRIAGE NOTE ---    The patient was evaluated by Sandy olmedo in triage. Orders were placed and the patient is currently awaiting disposition.    [AJ]      ED Course User Index  [AJ] Sandy Avery PA-C       Labs:    Lab Results (last 24 hours)       ** No results found for the last 24 hours. **             Imaging:    No Radiology Exams Resulted Within Past 24 Hours      Differential Diagnosis and Discussion:      Rash: Differential diagnosis includes but is not limited to sepsis, cellulitis, Paul  Mountain Spotted Fever, meningitis, meningococcemia, Varicella, Strep infection, dermatitis, allergic reaction, Lyme disease, and toxic shock syndrome.        MDM  Number of Diagnoses or Management Options  Down syndrome  Worried well  Diagnosis management comments: I have explained the patient´s condition, diagnoses and treatment plan based on the information available to me at this time. I have answered questions and addressed any concerns. The patient has a good  understanding of the patient´s diagnosis, condition, and treatment plan as can be expected at this point. The vital signs have been stable. The patient´s condition is stable and appropriate for discharge from the emergency department.      The patient will pursue further outpatient evaluation with the primary care physician or other designated or consulting physician as outlined in the discharge instructions. They are agreeable to this plan of care and follow-up instructions have been explained in detail. The patient has received these instructions in written format and have expressed an understanding of the discharge instructions. The patient is aware that any significant change in condition or worsening of symptoms should prompt an immediate return to this or the closest emergency department or call to 911.       Amount and/or Complexity of Data Reviewed  Obtain history from someone other than the patient: yes (Caregiver)    Risk of Complications, Morbidity, and/or Mortality  Presenting problems: low  Management options: low    Patient Progress  Patient progress: stable         Patient Care Considerations:    LABS: I considered ordering labs, however no systemic signs of illness or injury are noted      Consultants/Shared Management Plan:    None    Social Determinants of Health:    Patient has presented with family members who are responsible, reliable and will ensure follow up care.      Disposition and Care Coordination:    Discharged: The patient  is suitable and stable for discharge with no need for consideration of observation or admission.    I have explained the patient´s condition, diagnoses and treatment plan based on the information available to me at this time. I have answered questions and addressed any concerns. The patient has a good  understanding of the patient´s diagnosis, condition, and treatment plan as can be expected at this point. The vital signs have been stable. The patient´s condition is stable and appropriate for discharge from the emergency department.      The patient will pursue further outpatient evaluation with the primary care physician or other designated or consulting physician as outlined in the discharge instructions. They are agreeable to this plan of care and follow-up instructions have been explained in detail. The patient has received these instructions in written format and have expressed an understanding of the discharge instructions. The patient is aware that any significant change in condition or worsening of symptoms should prompt an immediate return to this or the closest emergency department or call to 911.  I have explained discharge medications and the need for follow up with the patient/caretakers. This was also printed in the discharge instructions. Patient was discharged with the following medications and follow up:      Medication List      No changes were made to your prescriptions during this visit.      Rojas Choi MD  [address] [facility] [place] [address]  [phone]    On 9/25/2023         Final diagnoses:   Worried well   Down syndrome        ED Disposition       ED Disposition   Discharge    Condition   Stable    Comment   --               This medical record created using voice recognition software.             Maria Antonia Kasper, APRTRISTIN  09/23/23 0700

## 2023-09-23 NOTE — DISCHARGE INSTRUCTIONS
Physical exam and evaluation was normal here today with no acute findings or signs of infestation.    Follow-up with PCP Monday

## 2023-12-01 RX ORDER — FAMOTIDINE 40 MG/5ML
20 POWDER, FOR SUSPENSION ORAL 2 TIMES DAILY
Qty: 50 ML | Refills: 3 | Status: SHIPPED | OUTPATIENT
Start: 2023-12-01

## 2023-12-01 NOTE — TELEPHONE ENCOUNTER
Pt called in wanting a refill on famotidine. Will send in a refill at this time. Pt will need appointment for any refills after

## 2024-01-15 RX ORDER — FAMOTIDINE 40 MG/5ML
POWDER, FOR SUSPENSION ORAL
Qty: 50 ML | Refills: 3 | Status: SHIPPED | OUTPATIENT
Start: 2024-01-15

## 2024-06-20 ENCOUNTER — OFFICE VISIT (OUTPATIENT)
Dept: OTOLARYNGOLOGY | Facility: CLINIC | Age: 39
End: 2024-06-20
Payer: MEDICAID

## 2024-06-20 ENCOUNTER — LAB (OUTPATIENT)
Dept: LAB | Facility: HOSPITAL | Age: 39
End: 2024-06-20
Payer: MEDICAID

## 2024-06-20 ENCOUNTER — PROCEDURE VISIT (OUTPATIENT)
Dept: OTOLARYNGOLOGY | Facility: CLINIC | Age: 39
End: 2024-06-20
Payer: MEDICAID

## 2024-06-20 VITALS — BODY MASS INDEX: 19.28 KG/M2 | TEMPERATURE: 97.5 F | WEIGHT: 108.8 LBS | HEIGHT: 63 IN

## 2024-06-20 DIAGNOSIS — H69.93 EUSTACHIAN TUBE DYSFUNCTION, BILATERAL: ICD-10-CM

## 2024-06-20 DIAGNOSIS — Q90.9 DOWN'S SYNDROME: ICD-10-CM

## 2024-06-20 DIAGNOSIS — R47.1 DYSARTHRIA: ICD-10-CM

## 2024-06-20 DIAGNOSIS — H61.23 HEARING LOSS DUE TO CERUMEN IMPACTION, BILATERAL: Primary | ICD-10-CM

## 2024-06-20 DIAGNOSIS — H90.3 SENSORINEURAL HEARING LOSS, BILATERAL: ICD-10-CM

## 2024-06-20 DIAGNOSIS — R13.12 OROPHARYNGEAL DYSPHAGIA: ICD-10-CM

## 2024-06-20 DIAGNOSIS — R53.83 TIREDNESS: ICD-10-CM

## 2024-06-20 DIAGNOSIS — K44.9 HIATAL HERNIA: ICD-10-CM

## 2024-06-20 LAB
T3FREE SERPL-MCNC: 3.62 PG/ML (ref 2–4.4)
T4 FREE SERPL-MCNC: 0.99 NG/DL (ref 0.92–1.68)
TSH SERPL DL<=0.05 MIU/L-ACNC: 1.85 UIU/ML (ref 0.27–4.2)

## 2024-06-20 PROCEDURE — 84439 ASSAY OF FREE THYROXINE: CPT

## 2024-06-20 PROCEDURE — 84481 FREE ASSAY (FT-3): CPT

## 2024-06-20 PROCEDURE — 84443 ASSAY THYROID STIM HORMONE: CPT

## 2024-06-20 PROCEDURE — 36415 COLL VENOUS BLD VENIPUNCTURE: CPT

## 2024-06-20 RX ORDER — PERMETHRIN 50 MG/G
CREAM TOPICAL
COMMUNITY

## 2024-06-20 RX ORDER — HYDROXYZINE HCL 10 MG/5 ML
SOLUTION, ORAL ORAL EVERY 6 HOURS SCHEDULED
COMMUNITY
Start: 2023-08-16

## 2024-06-20 NOTE — PROGRESS NOTES
AUDIOMETRIC EVALUATION      Name:  Albert Salguero  :  1985  Age:  39 y.o.  Date of Evaluation:  2024       History: Albert is seen today for a hearing evaluation due to cerumen issues.    Audiologic Information:  Concerns for Hearing: Yes periodically  PETs: No  Other otologic surgical history: None  Aural Pressure/Fullness: No  Otalgia: No  Otorrhea: None  Tinnitus: No  Dizziness: None  Noise Exposure: No  Family history of hearing loss: No  Head trauma requiring hospital stay: None  Chemotherapy: No  Other significant history: Down syndrome    EVALUATION:    See audiogram    RESULTS:    Otoscopic Evaluation:  Right: Unremarkable  Left: Unremarkable        NOTE: Testing completed after ears were examined by Dr. Wiley.    Tympanometry (226 Hz):  Right: Type A  Left: Type As    Otoacoustic emissions: Abnormal outer hair cell function for each ear.    IMPRESSIONS:  Pure tone thresholds for the right ear shows responses at a mild level.  Pure tone thresholds for the left ear shows responses at a mild level.    RECOMMENDATIONS/PLAN:  Follow-up recommendations of Dr. Wiley.  Repeat hearing evaluation in 1 year.  Discussed results and recommendations with patient's caregiver.        Terell Ornelas M.S, [facility]  Licensed Audiologist

## 2024-06-20 NOTE — PATIENT INSTRUCTIONS
1.  Bilateral ear canal impacted with cerumen was cleaned under microscope.  Otherwise tympanic membrane's are unremarkable.  2.  Audiogram obtained shows SRT of 30 on the right and 35 on the left.  Word recognition scores were not tested.  Patient has Down syndrome and pure tones show a mild hearing loss but it was inconsistent.  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  Tympanogram was type a on the right and type As on the left.  At this time I would recommend obtaining audiogram annually.  3.  According to mom patient definitely has trouble in the upper esophagus with swallow and sounds like mostly solid.  I am going to obtain modified barium swallow and see if he has any issues with the swallowing mechanism.  4.  Follow-up after the modified barium swallow and also thyroid function test.

## 2024-06-20 NOTE — PROGRESS NOTES
Patient Name: Albert Salguero   Visit Date: 06/20/2024   Patient ID: [id number]  Provider: Cesario Wiley MD    Sex: male  [place] [facility]   YOB: 1985  Location Address: [address] [place] [address], [address] [place] [address],?[place]?[id number]    Primary Care Provider Rojas Choi MD  Location Phone: [phone]    Referring Provider: Rojas Choi MD        Chief Complaint  DYSFUNCTION OF EUSTACHIAN TUBES    History of Present Illness  Albert Salguero is a 39 y.o. male who presents to [facility] for DYSFUNCTION OF EUSTACHIAN TUBES.  Patient has been evaluated by Dr. Choi in January of this year for complaints of eustachian tube dysfunction.  Patient was referred here for further evaluation and management.  Otherwise patient has been seen at [facility] here by Ms. Kay Oliva for evaluation of gagging and dysphagia as well as vomiting and referred subsequently to GI clinic for further evaluation and management.  Upper GI endoscopy was performed by Dr. Kumar on 6/14/2023 showing small hiatal hernia, mild stenosis at the GE junction which was dilated and biopsied.  Otherwise rest of the exam was unremarkable.  Biopsy indicated from the GE junction showed mild changes suggestive of reflux esophagitis.  Gastric biopsy at the antrum also showed mild chronic inactive gastritis.  Otherwise there were no other findings.  Patient did very well for about 7-month and now he is starting to have problems again hacking and coughing with food.  Patient is constantly clearing his throat as if something is in his throat blocking it.  Mom denies any difficulty with airway.  Patient has history of Down syndrome and has required open heart surgery as a child.  Otherwise patient has been noted to be very tired easily.  Patient makes more of silence instead of words.    Past Medical History:   Diagnosis Date    CHD (congenital heart disease)     Dental disease  "2022    Down syndrome     Eczema     GERD (gastroesophageal reflux disease)     Hx of heart surgery        Past Surgical History:   Procedure Laterality Date    CARDIAC SURGERY      DENTAL PROCEDURE      ENDOSCOPY N/A 06/14/2023    Procedure: ESOPHAGOGASTRODUODENOSCOPY with biopsies, diloation with 15- 18 mm balloon;  Surgeon: Garfield Kumar MD;  Location: [facility] ENDOSCOPY;  Service: Gastroenterology;  Laterality: N/A;  hiatal hernia, stricture         Current Outpatient Medications:     carbamide peroxide (Debrox) 6.5 % otic solution, Every 12 (Twelve) Hours., Disp: , Rfl:     famotidine (PEPCID) 40 mg/5 mL suspension, SHAKE LIQUID AND TAKE 2.5 ML BY MOUTH TWICE DAILY, Disp: 50 mL, Rfl: 3    hydrocortisone 1 % cream, Apply 1 application  topically to the appropriate area as directed 2 (Two) Times a Day. May also apply to irritated areas of face twice daily for up to 1 week., Disp: 60 g, Rfl: 0    hydrOXYzine (ATARAX) 10 MG/5ML syrup, Every 6 (Six) Hours., Disp: , Rfl:     permethrin (ELIMITE) 5 % cream, APPLY TOPICALLU AT BEDTIME ONCE AND REPEAT IN 10 TO 14 DAYS, Disp: , Rfl:      No Known Allergies    Social History     Tobacco Use    Smoking status: Never     Passive exposure: Current    Smokeless tobacco: Never    Tobacco comments:     Elizabet smoke outside   Vaping Use    Vaping status: Never Used   Substance Use Topics    Alcohol use: Never    Drug use: Never        Objective     Vital Signs:   Vitals:    06/20/24 1330   Temp: 97.5 °F (36.4 °C)   TempSrc: Temporal   Weight: 49.4 kg (108 lb 12.8 oz)   Height: 160 cm (63\")       Tobacco Use: Medium Risk (6/20/2024)    Patient History     Smoking Tobacco Use: Never     Smokeless Tobacco Use: Never     Passive Exposure: Current         Physical Exam    Constitutional   Appearance  well developed, well-nourished, alert and in no acute distress, voice clear and strong    Head   Inspection  no deformities or lesions      Face   Inspection  no facial " lesions; House-Brackmann I/VI bilaterally   Palpation  no TMJ crepitus nor  muscle tenderness bilaterally     Eyes/Vision   Visual Fields  extraocular movements are intact, no spontaneous or gaze-induced nystagmus  Conjunctivae  clear   Sclerae  clear   Pupils and Irises  pupils equal, round, and reactive to light.   Nystagmus  not present     Ears, Nose, Mouth and Throat  Ears  External Ears  appearance within normal limits, no lesions present   Otoscopic Examination  tympanic membrane appearance within normal limits bilaterally without perforations, well-aerated middle ears   Hearing  intact to conversational voice both ears   Tunning fork testing    Rinne:  Saldana:    Nose  External Nose  appearance normal   Intranasal Exam  mucosa within normal limits, vestibules normal, no intranasal lesions present, septum midline, sinuses non tender to percussion   Modified Dallam Test:    Oral Cavity  Oral Mucosa  oral mucosa normal without pallor or cyanosis   Lips  lip appearance normal   Teeth  normal dentition for age   Gums  gums pink, non-swollen, no bleeding present   Tongue  tongue appearance normal; normal mobility   Palate  hard palate normal, soft palate appearance normal with symmetric mobility     Throat  Oropharynx  no inflammation or lesions present, tonsils within normal limits   Hypopharynx  appearance within normal limits   Larynx  voice normal     Neck  Inspection/Palpation  normal appearance, no masses or tenderness, trachea midline; thyroid size normal, nontender, no nodules or masses present on palpation     Lymphatic  Neck  no lymphadenopathy present   Supraclavicular Nodes  no lymphadenopathy present   Preauricular Nodes  no lymphadenopathy present     Respiratory  Respiratory Effort  breathing unlabored   Inspection of Chest  normal appearance, no retractions     Musculoskeletal   Cervical back: Normal range of motion and neck supple.      Skin and Subcutaneous Tissue  General  Inspection  Regarding face and neck - there are no rashes present, no lesions present, and no areas of discoloration     Neurologic  Cranial Nerves  cranial nerves II-XII are grossly intact bilaterally   Gait and Station  normal gait, able to stand without diffculty    Psychiatric  Judgement and Insight  judgment and insight intact   Mood and Affect  mood normal, affect appropriate       RESULTS REVIEWED    I have reviewed the following information:   [x]  Previous Internal Note  [x]  Previous External Note:   [x]  Ordered Tests & Results:      Pathology:   Lab Results   Component Value Date    CASEREPORT  06/14/2023     Surgical Pathology Report                         Case: [id number]                                  Authorizing Provider:  Garfield Kumar MD  Collected:           06/14/2023 09:41 AM          Ordering Location:     [facility] Received:            06/14/2023 11:06 AM                                 SUITES                                                                       Pathologist:           Asha Wahl MD                                                     Specimens:   1) - Gastric, Antrum, antrum biopsies                                                               2) - GE Junction, ge junction biopsies                                                     CLININFO  06/14/2023     Oropharyngeal dysphagia  Nausea  Episode of gagging  Nausea and vomiting, unspecified vomiting type  Down's syndrome  Dysphagia, unspecified type      FINALDX  06/14/2023     1. Stomach, antrum, biopsy:   - Gastric antrum mucosa with mild chronic inactive gastritis   - Negative for Helicobacter pylori on routine H&E stain   - Negative for intestinal metaplasia, dysplasia and malignancy      2. Gastroesophageal junction, biopsy:   - Squamous mucosa with mild changes suggestive of reflux esophagitis   - Negative for intestinal metaplasia, dysplasia and malignancy            GROSSDES   "06/14/2023     1. Gastric, Antrum.  Received in formalin and labeled \" antrum\" are two fragments of tan soft tissue measuring 0.2-0.3 cm in greatest dimension. The specimen is entirely submitted in one cassette.    2. GE Junction.  Received in formalin and labeled \" GE junction\" are two fragments of tan soft tissue measuring 0.2-0.3 cm in greatest dimension. The specimen is entirely submitted in one cassette.   ANALY      MICRO  06/14/2023     Microscopic examination performed.         No results found for: \"TSH\", \"T3FREE\", \"FREET4\", \"PTH\", \"THYROGLB\", \"CALCIUM\", \"YPHG59ZK\", \"THYRSTIMIMMU\", \"THYROIDAB\"    No Images in the past 120 days found..    I have discussed the interpretation of the above results with the patient.    Ear Cerumen Removal    Date/Time: 6/20/2024 1:58 PM    Performed by: Cesario Wiley MD  Authorized by: Cesario Wiley MD    Anesthesia:  Local Anesthetic: none  Location details: left ear and right ear  Patient tolerance: patient tolerated the procedure well with no immediate complications  Comments: Under the microscope both ear canal cerumen impactions have been cleaned.  Patient has a very high riding inferior canal wall otherwise both tympanic membranes are unremarkable.  Procedure type: instrumentation, suction   Sedation:  Patient sedated: no                  Assessment and Plan   Diagnoses and all orders for this visit:    1. Hearing loss due to cerumen impaction, bilateral (Primary)  -     Ear Cerumen Removal    2. Eustachian tube dysfunction, bilateral  -     Comprehensive Hearing Test; Future    3. Oropharyngeal dysphagia  -     FL Video Swallow With Speech Single Contrast; Future    4. Dysarthria    5. Tiredness  -     TSH; Future  -     T4, free; Future  -     T3, Free; Future    6. Down's syndrome    7. Hiatal hernia    8. Sensorineural hearing loss, bilateral  -     Comprehensive Hearing Test; Future  -     Comprehensive Hearing Test; Future        Albert Salguero  reports that he has " never smoked. He has been exposed to tobacco smoke. He has never used smokeless tobacco.       Plan:  Patient Instructions   1.  Bilateral ear canal impacted with cerumen was cleaned under microscope.  Otherwise tympanic membrane's are unremarkable.  2.  Audiogram obtained shows SRT of 30 on the right and 35 on the left.  Word recognition scores were not tested.  Patient has Down syndrome and pure tones show a mild hearing loss but it was inconsistent.  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  Tympanogram was type a on the right and type As on the left.  At this time I would recommend obtaining audiogram annually.  3.  According to mom patient definitely has trouble in the upper esophagus with swallow and sounds like mostly solid.  I am going to obtain modified barium swallow and see if he has any issues with the swallowing mechanism.  4.  Follow-up after the modified barium swallow and also thyroid function test.    Follow Up   Return Follow-up after modified barium swallow and lab.  Patient was given instructions and counseling regarding his condition or for health maintenance advice. Please see specific information pulled into the AVS if appropriate.

## 2024-07-31 RX ORDER — FAMOTIDINE 40 MG/5ML
POWDER, FOR SUSPENSION ORAL
Qty: 50 ML | Refills: 3 | OUTPATIENT
Start: 2024-07-31

## 2024-08-21 ENCOUNTER — HOSPITAL ENCOUNTER (OUTPATIENT)
Dept: GENERAL RADIOLOGY | Facility: HOSPITAL | Age: 39
Discharge: HOME | End: 2024-08-21
Admitting: OTOLARYNGOLOGY
Payer: MEDICAID

## 2024-08-21 DIAGNOSIS — R13.12 OROPHARYNGEAL DYSPHAGIA: ICD-10-CM

## 2024-08-21 PROCEDURE — 74230 X-RAY XM SWLNG FUNCJ C+: CPT

## 2024-08-21 PROCEDURE — 92611 MOTION FLUOROSCOPY/SWALLOW: CPT

## 2024-08-21 RX ADMIN — BARIUM SULFATE 55 ML: 0.81 POWDER, FOR SUSPENSION ORAL at 12:18

## 2024-08-21 RX ADMIN — BARIUM SULFATE 50 ML: 400 SUSPENSION ORAL at 12:18

## 2024-08-21 RX ADMIN — BARIUM SULFATE 15 ML: 400 PASTE ORAL at 12:18

## 2024-08-21 NOTE — MBS/VFSS/FEES
Outpatient  - Speech Language Pathology   Swallow  Modified Barium Swallow   [facility]     Patient Name: Albert Salguero  : 1985  MRN: [id number]  Today's Date: 2024               Admit Date: 2024    Visit Dx:     ICD-10-CM ICD-9-CM   1. Oropharyngeal dysphagia  R13.12 787.22     Patient Active Problem List   Diagnosis    Oropharyngeal dysphagia    Nausea    Episode of gagging    Nausea and vomiting    Down's syndrome    Dysphagia     Past Medical History:   Diagnosis Date    CHD (congenital heart disease)     Dental disease     Down syndrome     Eczema     GERD (gastroesophageal reflux disease)     Hx of heart surgery      Past Surgical History:   Procedure Laterality Date    CARDIAC SURGERY      DENTAL PROCEDURE      ENDOSCOPY N/A 2023    Procedure: ESOPHAGOGASTRODUODENOSCOPY with biopsies, diloation with 15- 18 mm balloon;  Surgeon: Garfield Kumar MD;  Location: [facility] ENDOSCOPY;  Service: Gastroenterology;  Laterality: N/A;  hiatal hernia, stricture     MODIFIED BARIUM SWALLOW STUDY: SPEECH PATHOLOGY REPORT    PERTINENT INFORMATION:  Patient is a 39 year old male referred due to concern for aspiration.  .    Patient was referred for an MBSS by Dr. Wiley to rule out aspiration as well as to determine appropriate treatment plan for this patient.      PROCEDURE:    Patient was alert and cooperative.  The patient was viewed in lateral .  The following Ba consistencies were administered:  thin liquid from cup, puree and regular solids..  The following compensatory swallowing strategies were performed: N/A      RESULTS:    1.  Oral stage is characterized by good bolus preparation, mildly prolonged mastication. Timely oral transit.  Pharyngeal phase appears timely with good base of tongue retraction, laryngeal elevation and epiglottic inversion.  Patient does have a cricopharyngeal prominence however bolus clears without hesitation and no pharyngeal residue after completion of swallow.  No penetration or aspiration noted with any trial presented.  Intermittent throat clearing throughout study.      IMPRESSIONS:    Patient demonstrated functional oropharyngeal swallow.  No penetration or aspiration. No pharyngeal residue.  Patient does have a cricopharyngeal prominance but bolus clears without hesitation.      FUNCTIONAL DEFICIT: Patient scored level 7 of 7 on Functional Communication Measures for swallowing indicating a 0% limitation in function for current status, goal status, and discharge status.      RECOMMENDATIONS:   1.  Regular diet  2.  Thin liquids  3.  General swallow precautions.       YES: Patient/responsible party agrees with the plan of care and has been informed of all alternatives, risks and benefits.    Thank you for this referral.    EDUCATION  The patient has been educated in the following areas:   Dysphagia (Swallowing Impairment).       Rafaela Prado, SLP  8/21/2024

## 2024-08-23 RX ORDER — FAMOTIDINE 40 MG/5ML
POWDER, FOR SUSPENSION ORAL
Qty: 50 ML | Refills: 3 | OUTPATIENT
Start: 2024-08-23

## 2024-08-28 ENCOUNTER — OFFICE VISIT (OUTPATIENT)
Dept: OTOLARYNGOLOGY | Facility: CLINIC | Age: 39
End: 2024-08-28
Payer: MEDICAID

## 2024-08-28 VITALS — WEIGHT: 109 LBS | BODY MASS INDEX: 19.31 KG/M2 | TEMPERATURE: 97.5 F | HEIGHT: 63 IN

## 2024-08-28 DIAGNOSIS — Z98.890 STATUS POST DILATATION OF ESOPHAGEAL STRICTURE: ICD-10-CM

## 2024-08-28 DIAGNOSIS — K44.9 HERNIA, HIATAL: ICD-10-CM

## 2024-08-28 DIAGNOSIS — Q90.9 DOWN'S SYNDROME: ICD-10-CM

## 2024-08-28 DIAGNOSIS — H69.93 EUSTACHIAN TUBE DYSFUNCTION, BILATERAL: ICD-10-CM

## 2024-08-28 DIAGNOSIS — K22.4 CRICOPHARYNGEUS MUSCLE DYSFUNCTION: ICD-10-CM

## 2024-08-28 DIAGNOSIS — R53.83 TIREDNESS: ICD-10-CM

## 2024-08-28 DIAGNOSIS — K44.9 HIATAL HERNIA: ICD-10-CM

## 2024-08-28 DIAGNOSIS — R47.1 DYSARTHRIA: ICD-10-CM

## 2024-08-28 DIAGNOSIS — Z87.19 STATUS POST DILATATION OF ESOPHAGEAL STRICTURE: ICD-10-CM

## 2024-08-28 DIAGNOSIS — K22.2 BENIGN ESOPHAGEAL STRICTURE: ICD-10-CM

## 2024-08-28 DIAGNOSIS — R13.12 OROPHARYNGEAL DYSPHAGIA: Primary | ICD-10-CM

## 2024-08-28 DIAGNOSIS — K21.9 GERD WITHOUT ESOPHAGITIS: ICD-10-CM

## 2024-08-28 NOTE — PATIENT INSTRUCTIONS
1.  Thyroid function test shows TSH normal at 1.85, free T3 normal at 3.62, and normal at 0.994 Free T4.  2.  Modified barium swallow revealed no tracheal aspiration or laryngeal penetration.  However does show prominent cricopharyngeus muscle but bolus clears without hesitation.  3.  It is apparent that patient has history of small hiatal hernia and GERD with lower esophageal stricture which have been biopsied and dilated approximately a year ago by Dr. Kumar.  Patient has symptoms again and modified barium swallow confirms that patient has a very prominent cricopharyngeus muscle as a result.  Therefore I recommend proceeding with suspension microlaryngoscopy, and flexible esophagogastroduodenoscopy with biopsy and balloon dilatation.    SML/EGD,BIOPSY AND DILATE (N/A)    The risks and benefits have been re-discussed and questions answered  DIRECT LARYNGOSCOPY WITH BIOPSY: The risks and benefits were explained including but not limited to pain, bleeding, infection, (including possible mediastinitis), the risks of the general anesthesia, pain, temporary or permanent hoarseness, airway loss, and/or tooth injury. No guarantees were made or implied.   ESOPHAGOSCOPY WITH BIOPSY AND DILATATION: The risks and benefits of esophagoscopy were explained including but not limited to bleeding, infection, (including possible mediastinitis), the risks of the general anesthesia, pain, temporary or permanent hoarseness, persistent/ recurrent disease, possible need for further treatment, airway loss, viscus perforation and/or tooth injury.

## 2024-09-12 ENCOUNTER — ANESTHESIA EVENT (OUTPATIENT)
Dept: PERIOP | Facility: HOSPITAL | Age: 39
End: 2024-09-12
Payer: MEDICAID

## 2024-09-12 RX ORDER — MULTIPLE VITAMINS W/ MINERALS TAB 9MG-400MCG
1 TAB ORAL DAILY
COMMUNITY

## 2024-09-12 NOTE — PRE-PROCEDURE INSTRUCTIONS
ATIENT INSTRUCTED TO BE:    - NOTHING TO EAT AFTER MIDNIGHT OR CHEW, EXCEPT CAN HAVE CLEAR LIQUIDS 2 HOURS PRIOR TO SURGERY ARRIVAL TIME , NO MORE THAN 8 OZ. (NOTHING RED)     - TO HOLD ALL VITAMINS, SUPPLEMENTS, NSAIDS FOR ONE WEEK PRIOR TO THEIR SURGICAL PROCEDURE    - DO NOT TAKE ___________NA___________ 7 DAYS PRIOR TO PROCEDURE PER ANESTHESIA RECOMMENDATIONS/INSTRUCTIONS     - INSTRUCTED PT TO USE SURGICAL SOAP 1 TIME THE NIGHT PRIOR TO SURGERY ___________ OR THE AM OF SURGERY _____________   USE THE SOAP FROM NECK TO TOES, AVOID THEIR FACE, HAIR, AND PRIVATE PARTS. IF USE THE SOAP THE NIGHT PRIOR TO SURGERY, CHANGE BED LINENS AND NO PETS IN THE BED.     INSTRUCTED NO LOTIONS, JEWELRY, PIERCINGS,  NAIL POLISH, OR DEODORANT DAY OF SURGERY    - IF DIABETIC, CHECK BLOOD GLUCOSE IF LESS THAN 70 OR HAVING SYMPTOMS CALL THE PREOP AREA FOR INSTRUCTIONS ON AM OF SURGERY ([phone]  TAKE THE FOLLOWING MEDICATIONS THE DAY OF SURGERY WITH SIPS OF WATER: FAMOTIDINE          - DO NOT BRING ANY MEDICATIONS WITH YOU TO THE HOSPITAL THE DAY OF SURGERY, EXCEPT IF USE INHALERS. BRING INHALERS DAY OF SURGERY       - BRING CPAP OR BIPAP TO THE HOSPITAL ONLY IF YOU ARE SPENDING THE NIGHT    - DO NOT SMOKE OR VAPE 24 HOURS PRIOR TO PROCEDURE PER ANESTHESIA REQUEST     -MAKE SURE YOU HAVE A RIDE HOME OR SOMEONE TO STAY WITH YOU THE DAY OF THE PROCEDURE AFTER YOU GO HOME     - FOLLOW ANY OTHER INSTRUCTIONS GIVEN TO YOU BY YOUR SURGEON'S OFFICE.     - DAY OF SURGERY ____________, COME TO ELEVATOR A, THIRD FLOOR, CHECK IN AT THE DESK FOR REGISTRATION/SURGERY    - YOU WILL RECEIVE A PHONE CALL THE DAY PRIOR TO SURGERY BETWEEN 1PM AND 4 PM WITH ARRIVAL TIME, IF YOUR SURGERY IS ON A MONDAY YOU WILL RECEIVE A CALL THE FRIDAY PRIOR TO SURGERY DATE    - BRING CASH OR CREDIT CARD FOR COPAYMENT OF MEDICATIONS AFTER SURGERY IF YOU USE THE HOSPITAL PHARMACY (MEDS TO BED)  NA  - PREADMISSION TESTING NURSE DHAVAL CENTENO [phone] IF HAVE ANY  QUESTIONS     -PATIENT PROVIDED THE NUMBER FOR PRE[facility] SURGICAL DEPT IF HAD QUESTIONS AFTER HOURS PRIOR TO SURGERY ([phone] ).  INFORMED PT IF NO ANSWER, LEAVE A MESSAGE AND SOMEONE WILL RETURN THEIR CALL       PATIENT VERBALIZED UNDERSTANDING

## 2024-09-13 ENCOUNTER — HOSPITAL ENCOUNTER (OUTPATIENT)
Facility: HOSPITAL | Age: 39
Setting detail: HOSPITAL OUTPATIENT SURGERY
Discharge: HOME | End: 2024-09-13
Attending: OTOLARYNGOLOGY | Admitting: OTOLARYNGOLOGY
Payer: MEDICAID

## 2024-09-13 ENCOUNTER — ANESTHESIA (OUTPATIENT)
Dept: PERIOP | Facility: HOSPITAL | Age: 39
End: 2024-09-13
Payer: MEDICAID

## 2024-09-13 VITALS
SYSTOLIC BLOOD PRESSURE: 112 MMHG | RESPIRATION RATE: 13 BRPM | HEIGHT: 62 IN | DIASTOLIC BLOOD PRESSURE: 28 MMHG | WEIGHT: 109.57 LBS | OXYGEN SATURATION: 98 % | TEMPERATURE: 98.6 F | BODY MASS INDEX: 20.16 KG/M2 | HEART RATE: 66 BPM

## 2024-09-13 DIAGNOSIS — K21.9 GERD WITHOUT ESOPHAGITIS: ICD-10-CM

## 2024-09-13 DIAGNOSIS — R13.12 OROPHARYNGEAL DYSPHAGIA: ICD-10-CM

## 2024-09-13 DIAGNOSIS — K22.4 CRICOPHARYNGEUS MUSCLE DYSFUNCTION: ICD-10-CM

## 2024-09-13 DIAGNOSIS — G89.18 POSTOPERATIVE PAIN: Primary | ICD-10-CM

## 2024-09-13 DIAGNOSIS — K22.2 BENIGN ESOPHAGEAL STRICTURE: ICD-10-CM

## 2024-09-13 DIAGNOSIS — K44.9 HERNIA, HIATAL: ICD-10-CM

## 2024-09-13 LAB
QT INTERVAL: 383 MS
QTC INTERVAL: 406 MS

## 2024-09-13 PROCEDURE — 43233 EGD BALLOON DIL ESOPH30 MM/>: CPT | Performed by: OTOLARYNGOLOGY

## 2024-09-13 PROCEDURE — 25010000002 DEXAMETHASONE PER 1 MG

## 2024-09-13 PROCEDURE — 31526 DX LARYNGOSCOPY W/OPER SCOPE: CPT | Performed by: OTOLARYNGOLOGY

## 2024-09-13 PROCEDURE — 25010000002 ONDANSETRON PER 1 MG

## 2024-09-13 PROCEDURE — 25010000002 PROPOFOL 10 MG/ML EMULSION

## 2024-09-13 PROCEDURE — 43239 EGD BIOPSY SINGLE/MULTIPLE: CPT | Performed by: OTOLARYNGOLOGY

## 2024-09-13 PROCEDURE — C1726 CATH, BAL DIL, NON-VASCULAR: HCPCS | Performed by: OTOLARYNGOLOGY

## 2024-09-13 PROCEDURE — 25810000003 LACTATED RINGERS PER 1000 ML: Performed by: ANESTHESIOLOGY

## 2024-09-13 PROCEDURE — 93005 ELECTROCARDIOGRAM TRACING: CPT | Performed by: OTOLARYNGOLOGY

## 2024-09-13 PROCEDURE — 88305 TISSUE EXAM BY PATHOLOGIST: CPT | Performed by: OTOLARYNGOLOGY

## 2024-09-13 PROCEDURE — 25010000002 PROPOFOL 500 MG/50ML EMULSION

## 2024-09-13 PROCEDURE — 25010000002 SUGAMMADEX 200 MG/2ML SOLUTION

## 2024-09-13 PROCEDURE — 25010000002 MIDAZOLAM PER 1MG: Performed by: ANESTHESIOLOGY

## 2024-09-13 PROCEDURE — 25010000002 FENTANYL CITRATE (PF) 50 MCG/ML SOLUTION

## 2024-09-13 RX ORDER — LIDOCAINE HYDROCHLORIDE 20 MG/ML
INJECTION, SOLUTION EPIDURAL; INFILTRATION; INTRACAUDAL; PERINEURAL AS NEEDED
Status: DISCONTINUED | OUTPATIENT
Start: 2024-09-13 | End: 2024-09-13 | Stop reason: SURG

## 2024-09-13 RX ORDER — MEPERIDINE HYDROCHLORIDE 25 MG/ML
12.5 INJECTION INTRAMUSCULAR; INTRAVENOUS; SUBCUTANEOUS
Status: DISCONTINUED | OUTPATIENT
Start: 2024-09-13 | End: 2024-09-13 | Stop reason: HOSPADM

## 2024-09-13 RX ORDER — MAGNESIUM HYDROXIDE 1200 MG/15ML
LIQUID ORAL AS NEEDED
Status: DISCONTINUED | OUTPATIENT
Start: 2024-09-13 | End: 2024-09-13 | Stop reason: HOSPADM

## 2024-09-13 RX ORDER — PROMETHAZINE HYDROCHLORIDE 25 MG/1
25 SUPPOSITORY RECTAL ONCE AS NEEDED
Status: DISCONTINUED | OUTPATIENT
Start: 2024-09-13 | End: 2024-09-13 | Stop reason: HOSPADM

## 2024-09-13 RX ORDER — DEXAMETHASONE SODIUM PHOSPHATE 4 MG/ML
INJECTION, SOLUTION INTRA-ARTICULAR; INTRALESIONAL; INTRAMUSCULAR; INTRAVENOUS; SOFT TISSUE AS NEEDED
Status: DISCONTINUED | OUTPATIENT
Start: 2024-09-13 | End: 2024-09-13 | Stop reason: SURG

## 2024-09-13 RX ORDER — ONDANSETRON 2 MG/ML
4 INJECTION INTRAMUSCULAR; INTRAVENOUS ONCE AS NEEDED
Status: DISCONTINUED | OUTPATIENT
Start: 2024-09-13 | End: 2024-09-13 | Stop reason: HOSPADM

## 2024-09-13 RX ORDER — FENTANYL CITRATE 50 UG/ML
INJECTION, SOLUTION INTRAMUSCULAR; INTRAVENOUS AS NEEDED
Status: DISCONTINUED | OUTPATIENT
Start: 2024-09-13 | End: 2024-09-13 | Stop reason: SURG

## 2024-09-13 RX ORDER — SODIUM CHLORIDE, SODIUM LACTATE, POTASSIUM CHLORIDE, CALCIUM CHLORIDE 600; 310; 30; 20 MG/100ML; MG/100ML; MG/100ML; MG/100ML
9 INJECTION, SOLUTION INTRAVENOUS CONTINUOUS PRN
Status: DISCONTINUED | OUTPATIENT
Start: 2024-09-13 | End: 2024-09-13 | Stop reason: HOSPADM

## 2024-09-13 RX ORDER — HYDROCODONE BITARTRATE AND ACETAMINOPHEN 7.5; 325 MG/1; MG/1
1 TABLET ORAL EVERY 4 HOURS PRN
Qty: 10 TABLET | Refills: 0 | Status: SHIPPED | OUTPATIENT
Start: 2024-09-13

## 2024-09-13 RX ORDER — ONDANSETRON 2 MG/ML
INJECTION INTRAMUSCULAR; INTRAVENOUS AS NEEDED
Status: DISCONTINUED | OUTPATIENT
Start: 2024-09-13 | End: 2024-09-13 | Stop reason: SURG

## 2024-09-13 RX ORDER — PROPOFOL 10 MG/ML
INJECTION, EMULSION INTRAVENOUS AS NEEDED
Status: DISCONTINUED | OUTPATIENT
Start: 2024-09-13 | End: 2024-09-13 | Stop reason: SURG

## 2024-09-13 RX ORDER — PROMETHAZINE HYDROCHLORIDE 12.5 MG/1
25 TABLET ORAL ONCE AS NEEDED
Status: DISCONTINUED | OUTPATIENT
Start: 2024-09-13 | End: 2024-09-13 | Stop reason: HOSPADM

## 2024-09-13 RX ORDER — ROCURONIUM BROMIDE 10 MG/ML
INJECTION, SOLUTION INTRAVENOUS AS NEEDED
Status: DISCONTINUED | OUTPATIENT
Start: 2024-09-13 | End: 2024-09-13 | Stop reason: SURG

## 2024-09-13 RX ORDER — MIDAZOLAM HYDROCHLORIDE 2 MG/2ML
2 INJECTION, SOLUTION INTRAMUSCULAR; INTRAVENOUS ONCE
Status: COMPLETED | OUTPATIENT
Start: 2024-09-13 | End: 2024-09-13

## 2024-09-13 RX ORDER — OXYCODONE HYDROCHLORIDE 5 MG/1
5 TABLET ORAL
Status: DISCONTINUED | OUTPATIENT
Start: 2024-09-13 | End: 2024-09-13 | Stop reason: HOSPADM

## 2024-09-13 RX ORDER — PHENYLEPHRINE HCL IN 0.9% NACL 1 MG/10 ML
SYRINGE (ML) INTRAVENOUS AS NEEDED
Status: DISCONTINUED | OUTPATIENT
Start: 2024-09-13 | End: 2024-09-13 | Stop reason: SURG

## 2024-09-13 RX ADMIN — PROPOFOL 120 MG: 10 INJECTION, EMULSION INTRAVENOUS at 09:43

## 2024-09-13 RX ADMIN — FENTANYL CITRATE 50 MCG: 50 INJECTION, SOLUTION INTRAMUSCULAR; INTRAVENOUS at 09:43

## 2024-09-13 RX ADMIN — LIDOCAINE HYDROCHLORIDE 60 MG: 20 INJECTION, SOLUTION EPIDURAL; INFILTRATION; INTRACAUDAL; PERINEURAL at 09:43

## 2024-09-13 RX ADMIN — Medication 50 MCG: at 09:50

## 2024-09-13 RX ADMIN — Medication 100 MCG: at 11:01

## 2024-09-13 RX ADMIN — MIDAZOLAM HYDROCHLORIDE 2 MG: 1 INJECTION, SOLUTION INTRAMUSCULAR; INTRAVENOUS at 09:16

## 2024-09-13 RX ADMIN — DEXAMETHASONE SODIUM PHOSPHATE 4 MG: 4 INJECTION, SOLUTION INTRAMUSCULAR; INTRAVENOUS at 09:48

## 2024-09-13 RX ADMIN — Medication 50 MCG: at 09:53

## 2024-09-13 RX ADMIN — SODIUM CHLORIDE, POTASSIUM CHLORIDE, SODIUM LACTATE AND CALCIUM CHLORIDE 9 ML/HR: 600; 310; 30; 20 INJECTION, SOLUTION INTRAVENOUS at 08:32

## 2024-09-13 RX ADMIN — SUGAMMADEX 200 MG: 100 INJECTION, SOLUTION INTRAVENOUS at 10:44

## 2024-09-13 RX ADMIN — Medication 50 MCG: at 10:12

## 2024-09-13 RX ADMIN — ONDANSETRON 4 MG: 2 INJECTION INTRAMUSCULAR; INTRAVENOUS at 09:48

## 2024-09-13 RX ADMIN — ROCURONIUM BROMIDE 50 MG: 10 INJECTION, SOLUTION INTRAVENOUS at 09:43

## 2024-09-13 RX ADMIN — Medication 50 MCG: at 10:05

## 2024-09-13 RX ADMIN — SODIUM CHLORIDE, POTASSIUM CHLORIDE, SODIUM LACTATE AND CALCIUM CHLORIDE: 600; 310; 30; 20 INJECTION, SOLUTION INTRAVENOUS at 10:26

## 2024-09-13 RX ADMIN — PROPOFOL 150 MCG/KG/MIN: 10 INJECTION, EMULSION INTRAVENOUS at 09:43

## 2024-09-13 NOTE — H&P
PRIMARY CARE PROVIDER: Rojas Choi MD  REFERRING PROVIDER: Cesario Wiley MD    CHIEF COMPLAINT:  Preoperative evaluation for surgery    Subjective   History of Present Illness:  Albert Salguero is a  39 y.o.  male who is here for the following problems:    Oropharyngeal dysphagia    Benign esophageal stricture    Hernia, hiatal    Cricopharyngeus muscle dysfunction    GERD without esophagitis      He is scheduled for SUSPENSION MICROLARYNGOSCOPY, ESOPHAGOGASTRODUODENOSCOPY, BIOPSIES, BALLOON DILATATION (N/A). There has been no significant change in the history since the preoperative office evaluation.     Review of Systems:  CONSTITUTIONAL: no fever or chills  PULMONARY: no cough or shortness of breath  GI: no nausea or vomiting    Past History:  Medical History: has a past medical history of CHD (congenital heart disease), Dental disease (2022), Down syndrome, Eczema, Esophageal stricture, GERD (gastroesophageal reflux disease), and heart surgery.   Surgical History: has a past surgical history that includes Cardiac surgery; Dental surgery; and Esophagogastroduodenoscopy (N/A, 06/14/2023).   Family History: family history includes No Known Problems in his father and mother.   Social History: reports that he has never smoked. He has been exposed to tobacco smoke. He has never used smokeless tobacco. He reports that he does not drink alcohol and does not use drugs.  Home Medications:  carbamide peroxide, famotidine, hydrOXYzine, and multivitamin with minerals     Allergies: Patient has no known allergies.     Objective     Vital Signs:  Temp:  [98.1 °F (36.7 °C)] 98.1 °F (36.7 °C)  Heart Rate:  [68] 68  Resp:  [16] 16  BP: (142)/(39) 142/39    Physical Exam:  CONSTITUTIONAL: well nourished, well-developed, alert, oriented, in no acute distress   COMMUNICATION AND VOICE: able to communicate normally, normal voice quality  HEAD: normocephalic, no lesions, atraumatic, no tenderness, no masses   FACE: appearance  normal, no lesions, no tenderness, no deformities, facial motion symmetric  EYES: ocular motility normal, eyelids normal, orbits normal, no proptosis, conjunctiva normal , pupils equal, round   EARS:  Hearing: hearing to conversational voice intact bilaterally   External Ears: normal bilaterally, no lesions  NOSE:  External Nose: external nasal structure normal, no tenderness on palpation, no nasal discharge, no lesions, no evidence of trauma, nostrils patent   ORAL:  Lips: upper and lower lips without lesion   NECK:  Inspection and Palpation: neck appearance normal, no masses or tenderness  CHEST/RESPIRATORY: normal respiratory effort   CARDIOVASCULAR: no cyanosis or edema   NEUROLOGICAL/PSYCHIATRIC: oriented to time, place and person, mood normal, affect appropriate, CN II-XII intact grossly    ASSESSMENT:    Oropharyngeal dysphagia    Benign esophageal stricture    Hernia, hiatal    Cricopharyngeus muscle dysfunction    GERD without esophagitis    PLAN:  SUSPENSION MICROLARYNGOSCOPY, ESOPHAGOGASTRODUODENOSCOPY, BIOPSIES, BALLOON DILATATION (N/A)     It is apparent that patient has history of small hiatal hernia and GERD with lower esophageal stricture which have been biopsied and dilated approximately a year ago by Dr. Kumar.  Patient has symptoms again and modified barium swallow confirms that patient has a very prominent cricopharyngeus muscle as a result.  Therefore I recommend proceeding with suspension microlaryngoscopy, and flexible esophagogastroduodenoscopy with biopsy and balloon dilatation.     SML/EGD,BIOPSY AND DILATE (N/A)     The risks and benefits have been re-discussed and questions answered  DIRECT LARYNGOSCOPY WITH BIOPSY: The risks and benefits were explained including but not limited to pain, bleeding, infection, (including possible mediastinitis), the risks of the general anesthesia, pain, temporary or permanent hoarseness, airway loss, and/or tooth injury. No guarantees were made or  implied.   ESOPHAGOSCOPY WITH BIOPSY AND DILATATION: The risks and benefits of esophagoscopy were explained including but not limited to bleeding, infection, (including possible mediastinitis), the risks of the general anesthesia, pain, temporary or permanent hoarseness, persistent/ recurrent disease, possible need for further treatment, airway loss, viscus perforation and/or tooth injury.    Cesario Wiley MD  09/13/24  08:55 EDT

## 2024-09-13 NOTE — ANESTHESIA PREPROCEDURE EVALUATION
Anesthesia Evaluation     Patient summary reviewed and Nursing notes reviewed   no history of anesthetic complications:   NPO Solid Status: > 8 hours  NPO Liquid Status: > 2 hours           Airway   Mallampati: unable to access  TM distance: >3 FB  Neck ROM: full  No difficulty expected  Dental    (+) poor dentition        Pulmonary - negative pulmonary ROS and normal exam    breath sounds clear to auscultation  Cardiovascular - negative cardio ROS and normal exam  Exercise tolerance: good (4-7 METS)    ECG reviewed  Rhythm: regular  Rate: normal        Neuro/Psych- negative ROS    ROS Comment: Downs  GI/Hepatic/Renal/Endo - negative ROS     Musculoskeletal (-) negative ROS    Abdominal    Substance History - negative use     OB/GYN negative ob/gyn ROS         Other - negative ROS       ROS/Med Hx Other: DOWNS SYN,     REP HOLE IN HRT.   PRICE .ECHO  EF 61-65% INTERATRIAL SEP THICK/BRIGHT INDICATE POSS REPAIR.NO VI RESID ASD.    EKG  JUNCT RHY, BORDERLINE,RAD,NONSPEC TWAVE ABN.     CC 23 EGD.RHY STRIP SR. LA              Interpretation Summary          Left ventricular ejection fraction appears to be 61 - 65%.    Left ventricular diastolic function was normal.    Saline test results are negative.    Estimated right ventricular systolic pressure from tricuspid regurgitation is normal (<35 mmHg).    Interatrial septum is thickened and bright indicating possible prior repair, no evidence of residual ASD      VERY LOOSE LOWER TEETH         Anesthesia Plan    ASA 3     general     (Patient understands anesthesia not responsible for dental damage.)  intravenous induction     Anesthetic plan, risks, benefits, and alternatives have been provided, discussed and informed consent has been obtained with: patient and legal guardian.    Plan discussed with CRNA.        CODE STATUS:

## 2024-09-13 NOTE — ANESTHESIA POSTPROCEDURE EVALUATION
Patient: Albert Salguero    Procedure Summary       Date: 09/13/24 Room / Location: [facility] OR 02 / [facility] MAIN OR    Anesthesia Start: 0935 Anesthesia Stop: 1107    Procedure: SUSPENSION MICROLARYNGOSCOPY, ESOPHAGOGASTRODUODENOSCOPY, BIOPSIES, BALLOON DILATATION (Throat) Diagnosis:       Oropharyngeal dysphagia      Benign esophageal stricture      Hernia, hiatal      Cricopharyngeus muscle dysfunction      GERD without esophagitis      (Oropharyngeal dysphagia [R13.12])      (Benign esophageal stricture [K22.2])      (Hernia, hiatal [K44.9])      (Cricopharyngeus muscle dysfunction [K22.4])      (GERD without esophagitis [K21.9])    Surgeons: Cesario Wiley MD Provider: Emery Alexandra MD    Anesthesia Type: general ASA Status: 3            Anesthesia Type: general    Vitals  Vitals Value Taken Time   BP 97/56 09/13/24 1127   Temp 36.1 °C (97 °F) 09/13/24 1120   Pulse 63 09/13/24 1129   Resp 12 09/13/24 1120   SpO2 97 % 09/13/24 1129   Vitals shown include unfiled device data.        Post Anesthesia Care and Evaluation    Patient location during evaluation: bedside  Patient participation: complete - patient participated  Level of consciousness: awake  Pain score: 2  Pain management: adequate    Airway patency: patent  Anesthetic complications: No anesthetic complications  PONV Status: none  Cardiovascular status: acceptable and stable  Respiratory status: acceptable  Hydration status: acceptable

## 2024-09-23 LAB
QT INTERVAL: 383 MS
QTC INTERVAL: 406 MS

## 2024-10-16 RX ORDER — FAMOTIDINE 40 MG/5ML
20 POWDER, FOR SUSPENSION ORAL 2 TIMES DAILY
Qty: 50 ML | Refills: 3 | Status: SHIPPED | OUTPATIENT
Start: 2024-10-16

## 2024-10-31 ENCOUNTER — TELEPHONE (OUTPATIENT)
Dept: OTOLARYNGOLOGY | Facility: CLINIC | Age: 39
End: 2024-10-31

## 2024-10-31 NOTE — TELEPHONE ENCOUNTER
Caller: KARRIE CARRION    Relationship to patient: Emergency Contact    Best call back number: [phone]    Chief complaint: NEEDS TO RESCHEDULE APPT    Type of visit: POST-OP    Requested date: NEXT AVAILABLE     If rescheduling, when is the original appointment: 10/31/24  11:15A    Additional notes:OK TO LEAVE A VM

## 2024-11-04 ENCOUNTER — OFFICE VISIT (OUTPATIENT)
Dept: OTOLARYNGOLOGY | Facility: CLINIC | Age: 39
End: 2024-11-04
Payer: MEDICAID

## 2024-11-04 VITALS — HEIGHT: 62 IN | BODY MASS INDEX: 20.24 KG/M2 | TEMPERATURE: 97.5 F | WEIGHT: 110 LBS

## 2024-11-04 DIAGNOSIS — R47.1 DYSARTHRIA: ICD-10-CM

## 2024-11-04 DIAGNOSIS — K22.2 BENIGN ESOPHAGEAL STRICTURE: ICD-10-CM

## 2024-11-04 DIAGNOSIS — Z98.890 STATUS POST DILATATION OF ESOPHAGEAL STRICTURE: ICD-10-CM

## 2024-11-04 DIAGNOSIS — Q90.9 DOWN'S SYNDROME: ICD-10-CM

## 2024-11-04 DIAGNOSIS — H69.93 EUSTACHIAN TUBE DYSFUNCTION, BILATERAL: ICD-10-CM

## 2024-11-04 DIAGNOSIS — R13.12 OROPHARYNGEAL DYSPHAGIA: ICD-10-CM

## 2024-11-04 DIAGNOSIS — K21.9 GERD WITHOUT ESOPHAGITIS: ICD-10-CM

## 2024-11-04 DIAGNOSIS — K22.4 CRICOPHARYNGEUS MUSCLE DYSFUNCTION: Primary | ICD-10-CM

## 2024-11-04 DIAGNOSIS — Z87.19 STATUS POST DILATATION OF ESOPHAGEAL STRICTURE: ICD-10-CM

## 2024-11-04 PROCEDURE — 99213 OFFICE O/P EST LOW 20 MIN: CPT | Performed by: OTOLARYNGOLOGY

## 2024-11-04 PROCEDURE — 1159F MED LIST DOCD IN RCRD: CPT | Performed by: OTOLARYNGOLOGY

## 2024-11-04 PROCEDURE — 1160F RVW MEDS BY RX/DR IN RCRD: CPT | Performed by: OTOLARYNGOLOGY

## 2024-11-04 NOTE — PROGRESS NOTES
Patient Name: Albert Salguero   Visit Date: 11/04/2024   Patient ID: [id number]  Provider: Cesario Wiley MD    Sex: male  [place] [facility]   YOB: 1985  Location Address: [address] [place] [address], [address] [place] [address],?[place]?[id number]    Primary Care Provider Rojas Choi MD  Location Phone: [phone]    Referring Provider: No ref. provider found        Chief Complaint  1-2 month post op    History of Present Illness  Albert Salguero is a 39 y.o. male who presents to [facility] for 1-2 month post op. Patient has been evaluated by Dr. Choi in January of this year for complaints of eustachian tube dysfunction. Patient was referred here for further evaluation and management. Otherwise patient has been seen at [facility] clinic here by Ms. Kay Oliva for evaluation of gagging and dysphagia as well as vomiting and referred subsequently to GI clinic for further evaluation and management. Upper GI endoscopy was performed by Dr. Kumar on 6/14/2023 showing small hiatal hernia, mild stenosis at the GE junction which was dilated and biopsied. Otherwise rest of the exam was unremarkable. Biopsy indicated from the GE junction showed mild changes suggestive of reflux esophagitis. Gastric biopsy at the antrum also showed mild chronic inactive gastritis. Otherwise there were no other findings. Patient did very well for about 7-month and now he is starting to have problems again hacking and coughing with food. Patient is constantly clearing his throat as if something is in his throat blocking it. Mom denies any difficulty with airway. Patient has history of Down syndrome and has required open heart surgery as a child. Otherwise patient has been noted to be very tired easily. Patient makes more of silence instead of words.   Last visit, bilateral ear canal impacted with cerumen was cleaned under microscope.  Otherwise tympanic membrane's are  unremarkable.  Audiogram obtained shows SRT of 30 on the right and 35 on the left.  Word recognition scores were not tested.  Patient has Down syndrome and pure tones show a mild hearing loss but it was inconsistent.  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  Tympanogram was type a on the right and type As on the left.  At this time I would recommend obtaining audiogram annually.  According to mom patient definitely has trouble in the upper esophagus with swallow and sounds like mostly solid.  I am going to obtain modified barium swallow and see if he has any issues with the swallowing mechanism.  Modified barium swallow revealed  no tracheal aspiration or laryngeal penetration. However does show prominent cricopharyngeus muscle but bolus clears without hesitation.   On 9/13/2024, patient underwent following procedures.  1.  Suspension microlaryngoscopy with telescope.  2.  Flexible esophagogastroduodenoscopy with biopsies.  3.  Upper Esophageal balloon dilatation.  4.  Bimanual examination.    Operative findings show,  1.  Full dentitions and dental guard used throughout the case.  2.  True vocal cord, false vocal cord, epiglottis, vallecula, piriform      sinuses, post-cricoid, anterior arytenoid, and subglottis all within      normal limits.  3.  Duodenum, body, cardia and Pyloric antrum  within normal limits.  4.  GE junction with significant circumferential erosion and biopsy done.  5.  No significant hiatal hernia present.  6.  Esophagus within normal limits  7.  Upper sphincter of esophagus very tight and dilated with balloon to 60-Sinhala for 20 minutes with significant mucosal blanching and small mucosal tear.  8.  Base on tongue smooth on palpation.  9.  Oropharynx and oral cavity within normal limits.  10. No palpable cervical adenopathy or mass.  11. Dental guard removed and all dentitions left intact.      Past Medical History:   Diagnosis Date    CHD (congenital heart  "disease)     Dental disease     DECAY BUT NO CURRENT ISSUES    Down syndrome     Eczema     Esophageal stricture     GERD (gastroesophageal reflux disease)     Hx of heart surgery     HOLE IN HEART, REPAIRED AT BIRTH/NO CARDIOLOGIST NOW USES PCP       Past Surgical History:   Procedure Laterality Date    CARDIAC SURGERY          DENTAL PROCEDURE      ENDOSCOPY N/A 2023    Procedure: ESOPHAGOGASTRODUODENOSCOPY with biopsies, diloation with 15- 18 mm balloon;  Surgeon: Garfield Kumar MD;  Location: [facility];  Service: Gastroenterology;  Laterality: N/A;  hiatal hernia, stricture    PANENDOSCOPY N/A 2024    Procedure: SUSPENSION MICROLARYNGOSCOPY, ESOPHAGOGASTRODUODENOSCOPY, BIOPSIES, BALLOON DILATATION;  Surgeon: Cesario Wiley MD;  Location: [facility] MA[facility] OR;  Service: ENT;  Laterality: N/A;         Current Outpatient Medications:     famotidine (PEPCID) 40 mg/5 mL suspension, Take 2.5 mL by mouth 2 (Two) Times a Day., Disp: 50 mL, Rfl: 3    hydrOXYzine (ATARAX) 10 MG/5ML syrup, 4 (Four) Times a Day As Needed., Disp: , Rfl:     multivitamin with minerals tablet tablet, Take 1 tablet by mouth Daily., Disp: , Rfl:      No Known Allergies    Social History     Tobacco Use    Smoking status: Never     Passive exposure: Current    Smokeless tobacco: Never    Tobacco comments:     Elizabet smoke outside   Vaping Use    Vaping status: Never Used   Substance Use Topics    Alcohol use: Never    Drug use: Never        Objective     Vital Signs:   Vitals:    24 1444   Temp: 97.5 °F (36.4 °C)   TempSrc: Temporal   Weight: 49.9 kg (110 lb)   Height: 157.5 cm (62\")       Tobacco Use: Medium Risk (2024)    Patient History     Smoking Tobacco Use: Never     Smokeless Tobacco Use: Never     Passive Exposure: Current         Physical Exam    Constitutional   Appearance  well developed, well-nourished, alert and in no acute distress, voice clear and strong    Head   Inspection  no " deformities or lesions      Face   Inspection  no facial lesions; House-Brackmann I/VI bilaterally   Palpation  no TMJ crepitus nor  muscle tenderness bilaterally     Eyes/Vision   Visual Fields  extraocular movements are intact, no spontaneous or gaze-induced nystagmus  Conjunctivae  clear   Sclerae  clear   Pupils and Irises  pupils equal, round, and reactive to light.   Nystagmus  not present     Ears, Nose, Mouth and Throat  Ears  External Ears  appearance within normal limits, no lesions present   Otoscopic Examination  tympanic membrane appearance within normal limits bilaterally without perforations, well-aerated middle ears   Hearing  intact to conversational voice both ears   Tunning fork testing    Rinne:  Saldana:    Nose  External Nose  appearance normal   Intranasal Exam  mucosa within normal limits, vestibules normal, no intranasal lesions present, septum midline, sinuses non tender to percussion   Modified Weston Test:    Oral Cavity  Oral Mucosa  oral mucosa normal without pallor or cyanosis   Lips  lip appearance normal   Teeth  normal dentition for age   Gums  gums pink, non-swollen, no bleeding present   Tongue  tongue appearance normal; normal mobility   Palate  hard palate normal, soft palate appearance normal with symmetric mobility     Throat  Oropharynx  no inflammation or lesions present, tonsils within normal limits   Hypopharynx  appearance within normal limits   Larynx  voice normal     Neck  Inspection/Palpation  normal appearance, no masses or tenderness, trachea midline; thyroid size normal, nontender, no nodules or masses present on palpation     Lymphatic  Neck  no lymphadenopathy present   Supraclavicular Nodes  no lymphadenopathy present   Preauricular Nodes  no lymphadenopathy present     Respiratory  Respiratory Effort  breathing unlabored   Inspection of Chest  normal appearance, no retractions     Musculoskeletal   Cervical back: Normal range of motion and neck supple.   "    Skin and Subcutaneous Tissue  General Inspection  Regarding face and neck - there are no rashes present, no lesions present, and no areas of discoloration     Neurologic  Cranial Nerves  cranial nerves II-XII are grossly intact bilaterally   Gait and Station  normal gait, able to stand without diffculty    Psychiatric  Judgement and Insight  judgment and insight intact   Mood and Affect  mood normal, affect appropriate       RESULTS REVIEWED    I have reviewed the following information:   [x]  Previous Internal Note  []  Previous External Note:   [x]  Ordered Tests & Results:      Pathology:   Lab Results   Component Value Date    CASEREPORT  09/13/2024     Surgical Pathology Report                         Case: [id number]                                  Authorizing Provider:  Cesario Wiley MD         Collected:           09/13/2024 10:18 AM          Ordering Location:     [facility] MAIN Received:            09/13/2024 01:40 PM                                 OR                                                                           Pathologist:           Asha Wahl MD                                                     Specimen:    Stomach, GE JUNCTION                                                                       CLININFO  09/13/2024     Oropharyngeal dysphagia  Benign esophageal stricture  Hernia, hiatal  Cricopharyngeus muscle dysfunction  GERD without esophagitis      FINALDX  09/13/2024     Gastroesophageal junction, biopsy:   - Squamocolumnar mucosa with chronic inflammation   - Negative for intestinal metaplasia, dysplasia and malignancy        GROSSDES  09/13/2024     1. Stomach.  Received in formalin and labeled \"GE junction\" is a 0.4 cm fragment of tan soft tissue on a Telfa pad.  The specimen is submitted in toto in 1 cassette.   ANALY      MICRO  09/13/2024     Microscopic examination performed.         TSH   Date Value Ref Range Status   06/20/2024 1.850 0.270 - " 4.200 uIU/mL Final     T3, Free   Date Value Ref Range Status   06/20/2024 3.62 2.00 - 4.40 pg/mL Final     Free T4   Date Value Ref Range Status   06/20/2024 0.99 0.92 - 1.68 ng/dL Final       FL Video Swallow With Speech Single Contrast    Result Date: 8/22/2024  Impression: 1. No tracheal aspiration or laryngeal penetration 2. Prominent cricopharyngeus muscle Please consult speech pathology report for further details regarding the exam and for dietary recommendations. Report dictated by: Patricia Jarquin  I have personally reviewed this case and agree with the findings above: Electronically Signed: Goran Shelley MD  8/22/2024 4:29 PM EDT  Workstation ID: [id number]      I have discussed the interpretation of the above results with the patient.    Procedures          Assessment and Plan   Diagnoses and all orders for this visit:    1. Cricopharyngeus muscle dysfunction (Primary)    2. Oropharyngeal dysphagia    3. Down's syndrome    4. Dysarthria    5. Eustachian tube dysfunction, bilateral    6. Status post dilatation of esophageal stricture    7. Benign esophageal stricture    8. GERD without esophagitis        (K22.4) Cricopharyngeus muscle dysfunction    (R13.12) Oropharyngeal dysphagia    (Q90.9) Down's syndrome    (R47.1) Dysarthria    (H69.93) Eustachian tube dysfunction, bilateral    (Z98.890,  Z87.19) Status post dilatation of esophageal stricture    (K22.2) Benign esophageal stricture    (K21.9) GERD without esophagitis     Albert Salguero  reports that he has never smoked. He has been exposed to tobacco smoke. He has never used smokeless tobacco.       Plan:  Patient Instructions   1.  Patient is doing much better after esophageal dilatation.  2.  I have instructed on patient having avoidance of spicy, fried, acidic, and tomato-based foods in the evenings but rather have it for lunch.  Also need to give at least minimum 4 hours before going and laying down after dinner.  Patient is to avoid any caffeinated  drinks in the evening.  3.  I am going to see patient back in 6 months for follow-up and see how he is doing.      Follow Up   Return in about 6 months (around 5/4/2025), or Follow-up 6 months.  Patient was given instructions and counseling regarding his condition or for health maintenance advice. Please see specific information pulled into the AVS if appropriate.

## 2024-11-04 NOTE — PATIENT INSTRUCTIONS
1.  Patient is doing much better after esophageal dilatation.  2.  I have instructed on patient having avoidance of spicy, fried, acidic, and tomato-based foods in the evenings but rather have it for lunch.  Also need to give at least minimum 4 hours before going and laying down after dinner.  Patient is to avoid any caffeinated drinks in the evening.  3.  I am going to see patient back in 6 months for follow-up and see how he is doing.

## 2024-11-06 NOTE — PROGRESS NOTES
"Chief Complaint   Follow-up (1 year follow up )    History of Present Illness       Albert Salguero is a 39 y.o. male who presents to [facility] for follow-up with a history of Down syndrome, dysphagia, nausea, vomiting and gagging.  Patient presents to the office with his cousins who are his care tenders.  Patient has had procedure done with dilation per cousin.  Overall patient is doing well on famotidine liquid.  Patient evaluated by cardiologist Dr. Coleman due to congenital heart defect.  Patient family denies fever,  weight loss, night sweats, melena, hematochezia, hematemesis.    Most recent labs- 24    FL Video Swallow With Speech Single Contrast- 24 prominent cricopharyngeus muscle  Results       Result Review :   The following data was reviewed by: GENA Garcia on 2024:          Iron Profile No results found for: \"IRON\", \"TIBC\", \"LABIRON\", \"TRANSFERRIN\"  Ferritin No results found for: \"FERRITIN\"            Past Medical History       Past Medical History:   Diagnosis Date    CHD (congenital heart disease)     Dental disease     DECAY BUT NO CURRENT ISSUES    Down syndrome     Eczema     Esophageal stricture     GERD (gastroesophageal reflux disease)     Hx of heart surgery     HOLE IN HEART, REPAIRED AT BIRTH/NO CARDIOLOGIST NOW USES PCP       Past Surgical History:   Procedure Laterality Date    CARDIAC SURGERY          DENTAL PROCEDURE      ENDOSCOPY N/A 2023    Procedure: ESOPHAGOGASTRODUODENOSCOPY with biopsies, diloation with 15- 18 mm balloon;  Surgeon: Garfield Kumar MD;  Location: [facility] ENDOSCOPY;  Service: Gastroenterology;  Laterality: N/A;  hiatal hernia, stricture    PANENDOSCOPY N/A 2024    Procedure: SUSPENSION MICROLARYNGOSCOPY, ESOPHAGOGASTRODUODENOSCOPY, BIOPSIES, BALLOON DILATATION;  Surgeon: Cesario Wiley MD;  Location: [facility] MAIN OR;  Service: ENT;  Laterality: N/A;    UPPER GASTROINTESTINAL ENDOSCOPY " "          Current Outpatient Medications:     famotidine (PEPCID) 40 mg/5 mL suspension, Take 2.5 mL by mouth 2 (Two) Times a Day., Disp: 100 mL, Rfl: 11    hydrOXYzine (ATARAX) 10 MG/5ML syrup, 4 (Four) Times a Day As Needed. (Patient not taking: Reported on 11/11/2024), Disp: , Rfl:     multivitamin with minerals tablet tablet, Take 1 tablet by mouth Daily. (Patient not taking: Reported on 11/11/2024), Disp: , Rfl:      No Known Allergies    Family History   Problem Relation Age of Onset    No Known Problems Mother     No Known Problems Father     Diabetes Paternal Grandmother     Rheum arthritis Paternal Grandmother     Diabetes Paternal Aunt     Malig Hyperthermia Neg Hx         Social History     Social History Narrative    Not on file       Objective     Vital Signs:   /62 (BP Location: Right arm, Patient Position: Sitting, Cuff Size: Adult)   Pulse 75   Ht 157.5 cm (62.01\")   Wt 50.8 kg (112 lb)   SpO2 100%   BMI 20.48 kg/m²       Physical Exam  Constitutional:       Appearance: Normal appearance.   Pulmonary:      Effort: Pulmonary effort is normal.   Neurological:      Mental Status: He is alert. Mental status is at baseline.   Psychiatric:         Mood and Affect: Mood normal.           Assessment & Plan          Assessment and Plan    Diagnoses and all orders for this visit:    1. Nausea (Primary)    2. Down's syndrome    3. GERD without esophagitis    4. Oropharyngeal dysphagia    Other orders  -     Discontinue: famotidine (PEPCID) 40 mg/5 mL suspension; Take 2.5 mL by mouth 2 (Two) Times a Day.  Dispense: 100 mL; Refill: 11  -     famotidine (PEPCID) 40 mg/5 mL suspension; Take 2.5 mL by mouth 2 (Two) Times a Day.  Dispense: 100 mL; Refill: 11      39-year-old male for follow-up with a history of Down syndrome, dysphagia, nausea, vomiting and gagging.  Patient presents to the office with his cousins who are his care tenders.  Patient has had procedure done with dilation of the esophagus with " relief of dysphagia per cousin.  Overall patient is doing well on famotidine liquid.  Patient will follow-up in 1 year.  Patient and family agreeable to this plan.          Follow Up       Follow Up   Return in about 1 year (around 11/11/2025).  Patient was given instructions and counseling regarding his condition or for health maintenance advice. Please see specific information pulled into the AVS if appropriate.

## 2024-11-11 ENCOUNTER — OFFICE VISIT (OUTPATIENT)
Dept: GASTROENTEROLOGY | Facility: CLINIC | Age: 39
End: 2024-11-11
Payer: MEDICAID

## 2024-11-11 VITALS
HEART RATE: 75 BPM | OXYGEN SATURATION: 100 % | SYSTOLIC BLOOD PRESSURE: 123 MMHG | HEIGHT: 62 IN | BODY MASS INDEX: 20.61 KG/M2 | WEIGHT: 112 LBS | DIASTOLIC BLOOD PRESSURE: 62 MMHG

## 2024-11-11 DIAGNOSIS — Q90.9 DOWN'S SYNDROME: ICD-10-CM

## 2024-11-11 DIAGNOSIS — R13.12 OROPHARYNGEAL DYSPHAGIA: ICD-10-CM

## 2024-11-11 DIAGNOSIS — K21.9 GERD WITHOUT ESOPHAGITIS: ICD-10-CM

## 2024-11-11 DIAGNOSIS — R11.0 NAUSEA: Primary | ICD-10-CM

## 2024-11-11 PROCEDURE — 1160F RVW MEDS BY RX/DR IN RCRD: CPT | Performed by: NURSE PRACTITIONER

## 2024-11-11 PROCEDURE — 99214 OFFICE O/P EST MOD 30 MIN: CPT | Performed by: NURSE PRACTITIONER

## 2024-11-11 PROCEDURE — 1159F MED LIST DOCD IN RCRD: CPT | Performed by: NURSE PRACTITIONER

## 2024-11-11 RX ORDER — FAMOTIDINE 40 MG/5ML
20 POWDER, FOR SUSPENSION ORAL 2 TIMES DAILY
Qty: 100 ML | Refills: 11 | Status: SHIPPED | OUTPATIENT
Start: 2024-11-11 | End: 2024-11-11

## 2024-11-11 RX ORDER — FAMOTIDINE 40 MG/5ML
20 POWDER, FOR SUSPENSION ORAL 2 TIMES DAILY
Qty: 100 ML | Refills: 11 | Status: SHIPPED | OUTPATIENT
Start: 2024-11-11

## 2025-05-29 ENCOUNTER — OFFICE VISIT (OUTPATIENT)
Dept: OTOLARYNGOLOGY | Facility: CLINIC | Age: 40
End: 2025-05-29
Payer: MEDICAID

## 2025-05-29 VITALS
HEIGHT: 62 IN | WEIGHT: 108 LBS | BODY MASS INDEX: 19.88 KG/M2 | SYSTOLIC BLOOD PRESSURE: 119 MMHG | HEART RATE: 60 BPM | DIASTOLIC BLOOD PRESSURE: 78 MMHG | TEMPERATURE: 98.2 F

## 2025-05-29 DIAGNOSIS — H69.93 EUSTACHIAN TUBE DYSFUNCTION, BILATERAL: ICD-10-CM

## 2025-05-29 DIAGNOSIS — K21.9 GERD WITHOUT ESOPHAGITIS: ICD-10-CM

## 2025-05-29 DIAGNOSIS — K22.4 CRICOPHARYNGEUS MUSCLE DYSFUNCTION: ICD-10-CM

## 2025-05-29 DIAGNOSIS — Q90.9 DOWN'S SYNDROME: ICD-10-CM

## 2025-05-29 DIAGNOSIS — K22.2 BENIGN ESOPHAGEAL STRICTURE: ICD-10-CM

## 2025-05-29 DIAGNOSIS — R47.1 DYSARTHRIA: ICD-10-CM

## 2025-05-29 DIAGNOSIS — Z98.890 STATUS POST DILATATION OF ESOPHAGEAL STRICTURE: ICD-10-CM

## 2025-05-29 DIAGNOSIS — K44.9 HIATAL HERNIA: ICD-10-CM

## 2025-05-29 DIAGNOSIS — R13.12 OROPHARYNGEAL DYSPHAGIA: Primary | ICD-10-CM

## 2025-05-29 DIAGNOSIS — Z87.19 STATUS POST DILATATION OF ESOPHAGEAL STRICTURE: ICD-10-CM

## 2025-05-29 PROCEDURE — 1160F RVW MEDS BY RX/DR IN RCRD: CPT | Performed by: OTOLARYNGOLOGY

## 2025-05-29 PROCEDURE — 1159F MED LIST DOCD IN RCRD: CPT | Performed by: OTOLARYNGOLOGY

## 2025-05-29 PROCEDURE — 99213 OFFICE O/P EST LOW 20 MIN: CPT | Performed by: OTOLARYNGOLOGY

## 2025-05-29 NOTE — PATIENT INSTRUCTIONS
1.  Patient's oropharyngeal dysphagia is resolved and I think it is probably most likely him trying to clear his throat.  Enlarged tongue probably does not help which is consistent with the Down syndrome.  This is probably also the reason why he has some dysarthria.  2.  Patient does have a history of GERD with cricopharyngeal muscle dysfunction and benign esophageal stricture but currently he has no other symptoms related to this.  3.  I recommend patient follow-up as needed.

## 2025-05-29 NOTE — PROGRESS NOTES
Patient Name: Albert Salguero   Visit Date: 05/29/2025   Patient ID: [id number]  Provider: Cesario Wiley MD    Sex: male  Location: [facility]   YOB: 1985  Location Address: [address] [place] [address], [address] [place] [address],?[place]?[id number]    Primary Care Provider Rojas Choi MD  Location Phone: [phone]    Referring Provider: No ref. provider found        Chief Complaint  6 month follow up cricopharyngeus muscle dysfunction, oropharyngeal dysphagia, Dysarthria, ETD, and benign esophageal stricture    History of Present Illness  Albert Salguero is a 40 y.o. male who presents to [facility] for 6 month follow up cricopharyngeus muscle dysfunction, oropharyngeal dysphagia, Dysarthria, ETD, and benign esophageal stricture.     Patient has been evaluated by Dr. Choi in January of this year for complaints of eustachian tube dysfunction. Patient was referred here for further evaluation and management. Otherwise patient has been seen at [facility] clinic here by Ms. Kay Oliva for evaluation of gagging and dysphagia as well as vomiting and referred subsequently to GI clinic for further evaluation and management. Upper GI endoscopy was performed by Dr. Kumar on 6/14/2023 showing small hiatal hernia, mild stenosis at the GE junction which was dilated and biopsied. Otherwise rest of the exam was unremarkable. Biopsy indicated from the GE junction showed mild changes suggestive of reflux esophagitis. Gastric biopsy at the antrum also showed mild chronic inactive gastritis. Otherwise there were no other findings. Patient did very well for about 7-month and now he is starting to have problems again hacking and coughing with food. Patient is constantly clearing his throat as if something is in his throat blocking it. Mom denies any difficulty with airway. Patient has history of Down syndrome and has required open heart surgery as a child. Otherwise  patient has been noted to be very tired easily. Patient makes more of silence instead of words.   Last visit, bilateral ear canal impacted with cerumen was cleaned under microscope.  Otherwise tympanic membrane's are unremarkable.  Audiogram obtained shows SRT of 30 on the right and 35 on the left.  Word recognition scores were not tested.  Patient has Down syndrome and pure tones show a mild hearing loss but it was inconsistent.  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  Tympanogram was type a on the right and type As on the left.  At this time I would recommend obtaining audiogram annually.  According to mom patient definitely has trouble in the upper esophagus with swallow and sounds like mostly solid.  I am going to obtain modified barium swallow and see if he has any issues with the swallowing mechanism.  Modified barium swallow revealed  no tracheal aspiration or laryngeal penetration. However does show prominent cricopharyngeus muscle but bolus clears without hesitation.   On 9/13/2024, patient underwent following procedures.  1.  Suspension microlaryngoscopy with telescope.  2.  Flexible esophagogastroduodenoscopy with biopsies.  3.  Upper Esophageal balloon dilatation.  4.  Bimanual examination.     Operative findings show,  1.  Full dentitions and dental guard used throughout the case.  2.  True vocal cord, false vocal cord, epiglottis, vallecula, piriform      sinuses, post-cricoid, anterior arytenoid, and subglottis all within      normal limits.  3.  Duodenum, body, cardia and Pyloric antrum  within normal limits.  4.  GE junction with significant circumferential erosion and biopsy done.  5.  No significant hiatal hernia present.  6.  Esophagus within normal limits  7.  Upper sphincter of esophagus very tight and dilated with balloon to 60-Thai for 20 minutes with significant mucosal blanching and small mucosal tear.  8.  Base on tongue smooth on palpation.  9.  Oropharynx  and oral cavity within normal limits.  10. No palpable cervical adenopathy or mass.  11. Dental guard removed and all dentitions left intact.     Patient is doing much better after esophageal dilatation.  I have instructed on patient having avoidance of spicy, fried, acidic, and tomato-based foods in the evenings but rather have it for lunch.  Also need to give at least minimum 4 hours before going and laying down after dinner.  Patient is to avoid any caffeinated drinks in the evening.  I am going to see patient back in 6 months for follow-up and see how he is doing.    According to the caretaker patient is noted to be clearing his throat but he does not actually have trouble swallowing.  Past Medical History:   Diagnosis Date    Anemia     CHD (congenital heart disease)     Dental disease     DECAY BUT NO CURRENT ISSUES    Down syndrome     Eczema     Esophageal stricture     GERD (gastroesophageal reflux disease)     Hernia     Hx of heart surgery     HOLE IN HEART, REPAIRED AT BIRTH/NO CARDIOLOGIST NOW USES PCP       Past Surgical History:   Procedure Laterality Date    CARDIAC SURGERY          DENTAL PROCEDURE      ENDOSCOPY N/A 2023    Procedure: ESOPHAGOGASTRODUODENOSCOPY with biopsies, diloation with 15- 18 mm balloon;  Surgeon: Garfield Kumar MD;  Location: [facility] ENDOSCOPY;  Service: Gastroenterology;  Laterality: N/A;  hiatal hernia, stricture    PANENDOSCOPY N/A 2024    Procedure: SUSPENSION MICROLARYNGOSCOPY, ESOPHAGOGASTRODUODENOSCOPY, BIOPSIES, BALLOON DILATATION;  Surgeon: Cesario Wiley MD;  Location: [facility] MAIN OR;  Service: ENT;  Laterality: N/A;    UPPER GASTROINTESTINAL ENDOSCOPY           Current Outpatient Medications:     famotidine (PEPCID) 40 mg/5 mL suspension, Take 2.5 mL by mouth 2 (Two) Times a Day., Disp: 100 mL, Rfl: 11    hydrOXYzine (ATARAX) 10 MG/5ML syrup, 4 (Four) Times a Day As Needed. (Patient not taking: Reported on 2025), Disp: , Rfl:      "multivitamin with minerals tablet tablet, Take 1 tablet by mouth Daily. (Patient not taking: Reported on 5/29/2025), Disp: , Rfl:      No Known Allergies    Social History     Tobacco Use    Smoking status: Never     Passive exposure: Current    Smokeless tobacco: Never    Tobacco comments:     Elizabet smoke outside   Vaping Use    Vaping status: Never Used   Substance Use Topics    Alcohol use: Never    Drug use: Never        Objective     Vital Signs:   Vitals:    05/29/25 1356   BP: 119/78   Pulse: 60   Temp: 98.2 °F (36.8 °C)   Weight: 49 kg (108 lb)   Height: 157.5 cm (62.01\")       Tobacco Use: Medium Risk (5/29/2025)    Patient History     Smoking Tobacco Use: Never     Smokeless Tobacco Use: Never     Passive Exposure: Current         Physical Exam    Constitutional   Appearance  well developed, well-nourished, alert and in no acute distress, voice clear and strong    Head   Inspection  no deformities or lesions      Face   Inspection  no facial lesions; House-Brackmann I/VI bilaterally   Palpation  no TMJ crepitus nor  muscle tenderness bilaterally     Eyes/Vision   Visual Fields  extraocular movements are intact, no spontaneous or gaze-induced nystagmus  Conjunctivae  clear   Sclerae  clear   Pupils and Irises  pupils equal, round, and reactive to light.   Nystagmus  not present     Ears, Nose, Mouth and Throat  Ears  External Ears  appearance within normal limits, no lesions present   Otoscopic Examination  tympanic membrane appearance within normal limits bilaterally without perforations, well-aerated middle ears   Hearing  intact to conversational voice both ears   Tunning fork testing    Rinne:  [place]:    Nose  External Nose  appearance normal   Intranasal Exam  mucosa within normal limits, vestibules normal, no intranasal lesions present, septum midline, sinuses non tender to percussion   Modified [place] Test:    Oral Cavity  Oral Mucosa  oral mucosa normal without pallor or cyanosis "   Lips  lip appearance normal   Teeth  normal dentition for age   Gums  gums pink, non-swollen, no bleeding present   Tongue  tongue appearance normal; normal mobility  Large tongue  Palate  hard palate normal, soft palate appearance normal with symmetric mobility     Throat  Oropharynx  no inflammation or lesions present, tonsils within normal limits   Hypopharynx  appearance within normal limits   Larynx  voice normal     Neck  Inspection/Palpation  normal appearance, no masses or tenderness, trachea midline; thyroid size normal, nontender, no nodules or masses present on palpation     Lymphatic  Neck  no lymphadenopathy present   Supraclavicular Nodes  no lymphadenopathy present   Preauricular Nodes  no lymphadenopathy present     Respiratory  Respiratory Effort  breathing unlabored   Inspection of Chest  normal appearance, no retractions     Musculoskeletal   Cervical back: Normal range of motion and neck supple.      Skin and Subcutaneous Tissue  General Inspection  Regarding face and neck - there are no rashes present, no lesions present, and no areas of discoloration     Neurologic  Cranial Nerves  cranial nerves II-XII are grossly intact bilaterally   Gait and Station  normal gait, able to stand without diffculty    Psychiatric  Judgement and Insight  judgment and insight intact   Mood and Affect  mood normal, affect appropriate       RESULTS REVIEWED    I have reviewed the following information:   [x]  Previous Internal Note  []  Previous External Note:   [x]  Ordered Tests & Results:      Pathology:   Lab Results   Component Value Date    Microscopic Description  09/13/2024     Microscopic examination performed.         TSH   Date Value Ref Range Status   06/20/2024 1.850 0.270 - 4.200 uIU/mL Final     T3, Free   Date Value Ref Range Status   06/20/2024 3.62 2.00 - 4.40 pg/mL Final     Free T4   Date Value Ref Range Status   06/20/2024 0.99 0.92 - 1.68 ng/dL Final       No Images in the past 120 days  found..    I have discussed the interpretation of the above results with the patient.    Procedures          Assessment and Plan   Diagnoses and all orders for this visit:    1. Oropharyngeal dysphagia (Primary)    2. Down's syndrome    3. Dysarthria    4. Eustachian tube dysfunction, bilateral    5. Status post dilatation of esophageal stricture    6. Benign esophageal stricture    7. Cricopharyngeus muscle dysfunction    8. GERD without esophagitis    9. Hiatal hernia        (R13.12) Oropharyngeal dysphagia    (Q90.9) Down's syndrome    (R47.1) Dysarthria    (H69.93) Eustachian tube dysfunction, bilateral    (Z98.890,  Z87.19) Status post dilatation of esophageal stricture    (K22.2) Benign esophageal stricture    (K22.4) Cricopharyngeus muscle dysfunction    (K21.9) GERD without esophagitis    (K44.9) Hiatal hernia     Albert MODI Noemy  reports that he has never smoked. He has been exposed to tobacco smoke. He has never used smokeless tobacco.         Plan:  Patient Instructions   1.  Patient's oropharyngeal dysphagia is resolved and I think it is probably most likely him trying to clear his throat.  Enlarged tongue probably does not help which is consistent with the Down syndrome.  This is probably also the reason why he has some dysarthria.  2.  Patient does have a history of GERD with cricopharyngeal muscle dysfunction and benign esophageal stricture but currently he has no other symptoms related to this.  3.  I recommend patient follow-up as needed.        Follow Up   Return if symptoms worsen or fail to improve.  Patient was given instructions and counseling regarding his condition or for health maintenance advice. Please see specific information pulled into the AVS if appropriate.

## (undated) DEVICE — BLCK/BITE BLOX WO/DENTL/RIM W/STRAP 54F

## (undated) DEVICE — PRECISOR EXL COATED DISPOSABLE BIOPSY FORCEPS, ALLIGATOR CUP, 230 CM X 2.3 MM X 2.8 MM: Brand: PRECISOR EXL

## (undated) DEVICE — TOWEL,OR,DSP,ST,BLUE,STD,4/PK,20PK/CS: Brand: MEDLINE

## (undated) DEVICE — SLV SCD KN/LEN ADJ EXPRSS BLENDED MD 1P/U

## (undated) DEVICE — SPONGE,NEURO,0.5"X3",XR,STRL,LF,10/PK: Brand: MEDLINE

## (undated) DEVICE — DEV INFL CRE STERIFLATE 60CC DISP

## (undated) DEVICE — ESOPHAGEAL BALLOON DILATATION CATHETER: Brand: CRE FIXED WIRE

## (undated) DEVICE — Device

## (undated) DEVICE — GLV SURG BIOGEL LTX PF 7 1/2

## (undated) DEVICE — MINOR-LF: Brand: MEDLINE INDUSTRIES, INC.

## (undated) DEVICE — DRSNG TELFA PAD NONADH STR 1S 3X4IN

## (undated) DEVICE — SOL IRRG H2O PL/BG 1000ML STRL

## (undated) DEVICE — SOLIDIFIER LIQLOC PLS 1500CC BT

## (undated) DEVICE — NEEDLE-FREE IV CONNECTOR WITH NEUTRAL FLUID DISPLACEMENT, POWER INJECTABLE: Brand: ONE-LINK

## (undated) DEVICE — Device: Brand: DEFENDO AIR/WATER/SUCTION AND BIOPSY VALVE

## (undated) DEVICE — SINGLE-USE BIOPSY FORCEPS: Brand: RADIAL JAW 4

## (undated) DEVICE — LINER SURG CANSTR SXN S/RIGD 1500CC

## (undated) DEVICE — NEEDLE,18GX1.5",REG,BEVEL: Brand: MEDLINE

## (undated) DEVICE — KIT,ANTI FOG,W/SPONGE & FLUID,SOFT PACK: Brand: MEDLINE

## (undated) DEVICE — CONN JET HYDRA H20 AUXILIARY DISP